# Patient Record
Sex: FEMALE | Race: WHITE | Employment: OTHER | URBAN - METROPOLITAN AREA
[De-identification: names, ages, dates, MRNs, and addresses within clinical notes are randomized per-mention and may not be internally consistent; named-entity substitution may affect disease eponyms.]

---

## 2021-12-30 ENCOUNTER — HOSPITAL ENCOUNTER (EMERGENCY)
Age: 81
Discharge: HOME | End: 2021-12-30
Attending: EMERGENCY MEDICINE
Payer: MEDICARE

## 2021-12-30 ENCOUNTER — APPOINTMENT (OUTPATIENT)
Dept: GENERAL RADIOLOGY | Age: 81
End: 2021-12-30
Attending: EMERGENCY MEDICINE
Payer: MEDICARE

## 2021-12-30 VITALS
HEART RATE: 81 BPM | DIASTOLIC BLOOD PRESSURE: 72 MMHG | TEMPERATURE: 98.6 F | HEIGHT: 66 IN | WEIGHT: 160 LBS | BODY MASS INDEX: 25.71 KG/M2 | SYSTOLIC BLOOD PRESSURE: 159 MMHG | RESPIRATION RATE: 17 BRPM | OXYGEN SATURATION: 95 %

## 2021-12-30 DIAGNOSIS — R06.00 DYSPNEA, UNSPECIFIED TYPE: Primary | ICD-10-CM

## 2021-12-30 DIAGNOSIS — R07.81 PLEURITIC CHEST PAIN: ICD-10-CM

## 2021-12-30 LAB
ALBUMIN SERPL-MCNC: 2.6 G/DL (ref 3.2–4.6)
ALBUMIN/GLOB SERPL: 0.6 {RATIO} (ref 1.2–3.5)
ALP SERPL-CCNC: 70 U/L (ref 50–136)
ALT SERPL-CCNC: 29 U/L (ref 12–65)
ANION GAP SERPL CALC-SCNC: 5 MMOL/L (ref 7–16)
AST SERPL-CCNC: 10 U/L (ref 15–37)
ATRIAL RATE: 79 BPM
BASOPHILS # BLD: 0 K/UL (ref 0–0.2)
BASOPHILS NFR BLD: 0 % (ref 0–2)
BILIRUB SERPL-MCNC: 0.4 MG/DL (ref 0.2–1.1)
BUN SERPL-MCNC: 17 MG/DL (ref 8–23)
CALCIUM SERPL-MCNC: 8.9 MG/DL (ref 8.3–10.4)
CALCULATED P AXIS, ECG09: 71 DEGREES
CALCULATED R AXIS, ECG10: 74 DEGREES
CALCULATED T AXIS, ECG11: 70 DEGREES
CHLORIDE SERPL-SCNC: 106 MMOL/L (ref 98–107)
CO2 SERPL-SCNC: 30 MMOL/L (ref 21–32)
CREAT SERPL-MCNC: 1.2 MG/DL (ref 0.6–1)
DIAGNOSIS, 93000: NORMAL
DIFFERENTIAL METHOD BLD: ABNORMAL
EOSINOPHIL # BLD: 0.3 K/UL (ref 0–0.8)
EOSINOPHIL NFR BLD: 3 % (ref 0.5–7.8)
ERYTHROCYTE [DISTWIDTH] IN BLOOD BY AUTOMATED COUNT: 13.6 % (ref 11.9–14.6)
GLOBULIN SER CALC-MCNC: 4.3 G/DL (ref 2.3–3.5)
GLUCOSE SERPL-MCNC: 173 MG/DL (ref 65–100)
HCT VFR BLD AUTO: 39.1 % (ref 35.8–46.3)
HGB BLD-MCNC: 12.1 G/DL (ref 11.7–15.4)
IMM GRANULOCYTES # BLD AUTO: 0.2 K/UL (ref 0–0.5)
IMM GRANULOCYTES NFR BLD AUTO: 2 % (ref 0–5)
LYMPHOCYTES # BLD: 1.3 K/UL (ref 0.5–4.6)
LYMPHOCYTES NFR BLD: 12 % (ref 13–44)
MAGNESIUM SERPL-MCNC: 3.2 MG/DL (ref 1.8–2.4)
MCH RBC QN AUTO: 29.5 PG (ref 26.1–32.9)
MCHC RBC AUTO-ENTMCNC: 30.9 G/DL (ref 31.4–35)
MCV RBC AUTO: 95.4 FL (ref 79.6–97.8)
MONOCYTES # BLD: 0.6 K/UL (ref 0.1–1.3)
MONOCYTES NFR BLD: 6 % (ref 4–12)
NEUTS SEG # BLD: 8.1 K/UL (ref 1.7–8.2)
NEUTS SEG NFR BLD: 77 % (ref 43–78)
NRBC # BLD: 0 K/UL (ref 0–0.2)
P-R INTERVAL, ECG05: 150 MS
PLATELET # BLD AUTO: 423 K/UL (ref 150–450)
PMV BLD AUTO: 9.4 FL (ref 9.4–12.3)
POTASSIUM SERPL-SCNC: 4.6 MMOL/L (ref 3.5–5.1)
PROT SERPL-MCNC: 6.9 G/DL (ref 6.3–8.2)
Q-T INTERVAL, ECG07: 388 MS
QRS DURATION, ECG06: 84 MS
QTC CALCULATION (BEZET), ECG08: 444 MS
RBC # BLD AUTO: 4.1 M/UL (ref 4.05–5.2)
SODIUM SERPL-SCNC: 141 MMOL/L (ref 136–145)
TROPONIN-HIGH SENSITIVITY: 5.4 PG/ML (ref 0–14)
TROPONIN-HIGH SENSITIVITY: 5.9 PG/ML (ref 0–14)
VENTRICULAR RATE, ECG03: 79 BPM
WBC # BLD AUTO: 10.6 K/UL (ref 4.3–11.1)

## 2021-12-30 PROCEDURE — 80053 COMPREHEN METABOLIC PANEL: CPT

## 2021-12-30 PROCEDURE — 85025 COMPLETE CBC W/AUTO DIFF WBC: CPT

## 2021-12-30 PROCEDURE — 71045 X-RAY EXAM CHEST 1 VIEW: CPT

## 2021-12-30 PROCEDURE — 99285 EMERGENCY DEPT VISIT HI MDM: CPT

## 2021-12-30 PROCEDURE — 93005 ELECTROCARDIOGRAM TRACING: CPT | Performed by: EMERGENCY MEDICINE

## 2021-12-30 PROCEDURE — 83735 ASSAY OF MAGNESIUM: CPT

## 2021-12-30 PROCEDURE — 84484 ASSAY OF TROPONIN QUANT: CPT

## 2021-12-30 RX ORDER — ALBUTEROL SULFATE 90 UG/1
2 AEROSOL, METERED RESPIRATORY (INHALATION)
Qty: 1 EACH | Refills: 0 | Status: SHIPPED | OUTPATIENT
Start: 2021-12-30

## 2021-12-30 RX ORDER — SODIUM CHLORIDE 0.9 % (FLUSH) 0.9 %
5-10 SYRINGE (ML) INJECTION EVERY 8 HOURS
Status: DISCONTINUED | OUTPATIENT
Start: 2021-12-30 | End: 2021-12-30 | Stop reason: HOSPADM

## 2021-12-30 RX ORDER — BUDESONIDE 0.5 MG/2ML
500 INHALANT ORAL 2 TIMES DAILY
Qty: 1 EACH | Refills: 0 | Status: SHIPPED | OUTPATIENT
Start: 2021-12-30

## 2021-12-30 RX ORDER — SODIUM CHLORIDE 0.9 % (FLUSH) 0.9 %
5-10 SYRINGE (ML) INJECTION AS NEEDED
Status: DISCONTINUED | OUTPATIENT
Start: 2021-12-30 | End: 2021-12-30 | Stop reason: HOSPADM

## 2021-12-30 RX ORDER — ALBUTEROL SULFATE 0.83 MG/ML
2.5 SOLUTION RESPIRATORY (INHALATION)
Qty: 1 NEBULE | Refills: 0 | Status: SHIPPED | OUTPATIENT
Start: 2021-12-30

## 2021-12-30 NOTE — ED NOTES
I have reviewed discharge instructions with the patient. The patient verbalized understanding. Patient left ED via Discharge Method: ambulatory to Home with family). Opportunity for questions and clarification provided. Patient given 3 scripts. To continue your aftercare when you leave the hospital, you may receive an automated call from our care team to check in on how you are doing. This is a free service and part of our promise to provide the best care and service to meet your aftercare needs.  If you have questions, or wish to unsubscribe from this service please call [phone]. Thank you for Choosing our [address] [place] [address] Emergency Department.

## 2021-12-30 NOTE — ED PROVIDER NOTES
80-year-old female with a history of COPD presents with persistent shortness of breath. Her   3 weeks ago of pneumonia so her family moved her here from [place] for the holidays. She developed chest pain and shortness of breath and has been seen twice at [facility] last week. She was admitted for pneumonia on the second visit for 2 days. She has completed prednisone and antibiotics. She states she has been using inhaler, but is now out. Shortness of breath is worse with lying flat and minimal exertion. She has some right-sided chest pain with deep breathing. She denies cough or fever. She has had some wheezing. No leg swelling. Last admission -: \"History of Present Illness:   Taken from admission H&P:    Fartun Apple is a 80 y.o. female with HPT, COPD, breast cancer who presents with increasing shortness of breath, cough productive of sputum, and right sided chest pain. She first presented to the ED on  with pleuritic chest pain and shortness of breath. This was felt to be musculoskeletal as she gave a history of trying to pull herself up in a truck with her right arm. She was given lidocaine patches and discharged home. She then had a virtual visit yesterday with urgent care and was instructed to return to the ED given worsening shortness of breath. No fevers or chills. She denies any trauma, falls, or cardiac history. Of note, she had a recent car ride to [place] from [place]. No history of DVT/PE. She is vaccinated against COVID but did not receive her flu vaccine. Hospital Course:     1.  Acute respiratory failure with hypoxia - POA; hypoxic on arrival requiring 3L via NC; no home oxygen requirement; etiology of hypoxic respiratory failure with multifactorial in the setting of R sided pneumonia and mild COPD exacerbation; able to quickly wean to RA on the day prior to discharge; underwent 6MWT on the day of discharge and O2 stayed above 95% while walking; no home oxygen needed at discharge  2. CAP, RLL - noted; CXR with R basilar consolidation and small pleural effuusion; likely represents CAP with small parapneumonia effusion; effusion was evaluated with POCUS for consideration of thoracentesis, though no drainable pocket was appreciated; RCx was ordered, but pt's cough remained non-productive so it was never collected; she was tx with abx while here and will be discharged with three more days of oral abx to complete a 5 day course  3. COPD exacerbation, acute - POA; resolving on the day of discharge, pt without wheezing today; pt will continue prednisone for three more days at discharge to complete a 5 day course; she will continue her home bronchodilators   4. R pleural effusion - likely parapneumonic; POCUS without good pocket to drain; worked with IS given concern for atelectasis also contributing to hypoxia  5. Chest wall pain - noted; improved with prn lidocaine patches  6. Steroid induced hyperglycemia - noted; A1c 5.4% this ad mission  7. Normocytic anemia - stable and around her baseline at the time of discharge  8. Compression fx - noted on CT scan on arrival; age indeterminate; denies any recent trauma so suspect this is chronic in nature; PCP follow up when she returns home \"    CTA:  Impression  Performed by George Scott  1. Pulmonary arteries:  No evidence of acute pulmonary embolism. 2. Small to moderate right pleural effusion with overlying atelectasis. Several ill-defined micronodular opacities in the right lung may be infectious/inflammatory. 3. Unhealed compression fracture of the T12 vertebral body with approximately 40% height loss, age-indeterminate by CT. Correlate with history of trauma and pain referrable to this injury. MRI could further evaluate acuity, as clinically indicated. Shortness of Breath  Associated symptoms include chest pain.  Pertinent negatives include no fever, no headaches, no cough, no vomiting, no abdominal pain, no rash and no leg swelling. No past medical history on file. No past surgical history on file. No family history on file. Social History     Socioeconomic History    Marital status:      Spouse name: Not on file    Number of children: Not on file    Years of education: Not on file    Highest education level: Not on file   Occupational History    Not on file   Tobacco Use    Smoking status: Not on file    Smokeless tobacco: Not on file   Substance and Sexual Activity    Alcohol use: Not on file    Drug use: Not on file    Sexual activity: Not on file   Other Topics Concern    Not on file   Social History Narrative    Not on file     Social Determinants of Health     Financial Resource Strain:     Difficulty of Paying Living Expenses: Not on file   Food Insecurity:     Worried About Running Out of Food in the Last Year: Not on file    Cherelle of Food in the Last Year: Not on file   Transportation Needs:     Lack of Transportation (Medical): Not on file    Lack of Transportation (Non-Medical):  Not on file   Physical Activity:     Days of Exercise per Week: Not on file    Minutes of Exercise per Session: Not on file   Stress:     Feeling of Stress : Not on file   Social Connections:     Frequency of Communication with Friends and Family: Not on file    Frequency of Social Gatherings with Friends and Family: Not on file    Attends [employer] Services: Not on file    Active Member of [address] [place] [address] or Organizations: Not on file    Attends Club or Organization Meetings: Not on file    Marital Status: Not on file   Intimate Partner Violence:     Fear of Current or Ex-Partner: Not on file    Emotionally Abused: Not on file    Physically Abused: Not on file    Sexually Abused: Not on file   Housing Stability:     Unable to Pay for Housing in the Last Year: Not on file    Number of [place] in the Last Year: Not on file    Unstable Housing in the Last Year: Not on file ALLERGIES: Morphine    Review of Systems   Constitutional: Positive for fatigue. Negative for fever. HENT: Negative for hearing loss. Eyes: Negative for visual disturbance. Respiratory: Positive for shortness of breath. Negative for cough. Cardiovascular: Positive for chest pain. Negative for leg swelling. Gastrointestinal: Negative for abdominal pain, diarrhea, nausea and vomiting. Musculoskeletal: Negative for back pain. Skin: Negative for rash. Neurological: Negative for headaches. Psychiatric/Behavioral: Negative for confusion. All other systems reviewed and are negative. Vitals:    12/30/21 0942 12/30/21 0943   BP: (!) 133/53    Pulse: 81    Resp: 17    Temp: 98.6 °F (37 °C)    SpO2: 97%    Weight:  72.6 kg (160 lb)   Height:  5' 6\" (1.676 m)            Physical Exam  Vitals and nursing note reviewed. Constitutional:       Appearance: Normal appearance. She is well-developed. HENT:      Head: Normocephalic and atraumatic. Nose: Nose normal.      Mouth/Throat:      Mouth: Mucous membranes are moist.   Eyes:      Pupils: Pupils are equal, round, and reactive to light. Cardiovascular:      Rate and Rhythm: Regular rhythm. Heart sounds: Normal heart sounds. Pulmonary:      Effort: Pulmonary effort is normal.      Breath sounds: Normal breath sounds. Abdominal:      Palpations: Abdomen is soft. Tenderness: There is no abdominal tenderness. Musculoskeletal:         General: No deformity. Normal range of motion. Cervical back: Normal range of motion and neck supple. Skin:     General: Skin is warm and dry. Neurological:      General: No focal deficit present. Mental Status: She is alert. Mental status is at baseline.    Psychiatric:         Mood and Affect: Mood normal.         Behavior: Behavior normal.          MDM  Number of Diagnoses or Management Options  Diagnosis management comments: Parts of this document were created using dragon voice recognition software. The chart has been reviewed but errors may still be present. I wore appropriate PPE throughout this patient's ED visit. Norm Crespo MD, 11:33 AM    11:50 AM  No hypoxia. Slight persistent right lower lobe pneumonia. Already worked up for PE and this was negative. Will discharge with prescription for inhaler. I discussed the results of all labs, procedures, radiographs, and treatments with the patient and available family. Treatment plan is agreed upon and the patient is ready for discharge. Questions about treatment in the ED and differential diagnosis of presenting condition were answered. Patient was given verbal discharge instructions including, but not limited to, importance of returning to the emergency department for any concern of worsening or continued symptoms. Instructions were given to follow up with a primary care provider or specialist within 1-2 days. Adverse effects of medications, if prescribed, were discussed and patient was advised to refrain from significant physical activity until followed up by primary care physician and to not drive or operate heavy machinery after taking any sedating substances.              Amount and/or Complexity of Data Reviewed  Clinical lab tests: reviewed and ordered (Results for orders placed or performed during the hospital encounter of 12/30/21  -CBC WITH AUTOMATED DIFF:        Result                      Value             Ref Range           WBC                         10.6              4.3 - 11.1 K*       RBC                         4.10              4.05 - 5.2 M*       HGB                         12.1              11.7 - 15.4 *       HCT                         39.1              35.8 - 46.3 %       MCV                         95.4              79.6 - 97.8 *       MCH                         29.5              26.1 - 32.9 *       MCHC                        30.9 (L)          31.4 - 35.0 *       RDW                         13.6 11.9 - 14.6 %       PLATELET                    423               150 - 450 K/*       MPV                         9.4               9.4 - 12.3 FL       ABSOLUTE NRBC               0.00              0.0 - 0.2 K/*       DF                          AUTOMATED                             NEUTROPHILS                 77                43 - 78 %           LYMPHOCYTES                 12 (L)            13 - 44 %           MONOCYTES                   6                 4.0 - 12.0 %        EOSINOPHILS                 3                 0.5 - 7.8 %         BASOPHILS                   0                 0.0 - 2.0 %         IMMATURE GRANULOCYTES       2                 0.0 - 5.0 %         ABS. NEUTROPHILS            8.1               1.7 - 8.2 K/*       ABS. LYMPHOCYTES            1.3               0.5 - 4.6 K/*       ABS. MONOCYTES              0.6               0.1 - 1.3 K/*       ABS. EOSINOPHILS            0.3               0.0 - 0.8 K/*       ABS. BASOPHILS              0.0               0.0 - 0.2 K/*       ABS. IMM.  GRANS.            0.2               0.0 - 0.5 K/*  -METABOLIC PANEL, COMPREHENSIVE:        Result                      Value             Ref Range           Sodium                      141               136 - 145 mm*       Potassium                   4.6               3.5 - 5.1 mm*       Chloride                    106               98 - 107 mmo*       CO2                         30                21 - 32 mmol*       Anion gap                   5 (L)             7 - 16 mmol/L       Glucose                     173 (H)           65 - 100 mg/*       BUN                         17                8 - 23 MG/DL        Creatinine                  1.20 (H)          0.6 - 1.0 MG*       GFR est AA                  55 (L)            >60 ml/min/1*       GFR est non-AA              46 (L)            >60 ml/min/1*       Calcium                     8.9               8.3 - 10.4 M*       Bilirubin, total            0.4 0.2 - 1.1 MG*       ALT (SGPT)                  29                12 - 65 U/L         AST (SGOT)                  10 (L)            15 - 37 U/L         Alk. phosphatase            70                50 - 136 U/L        Protein, total              6.9               6.3 - 8.2 g/*       Albumin                     2.6 (L)           3.2 - 4.6 g/*       Globulin                    4.3 (H)           2.3 - 3.5 g/*       A-G Ratio                   0.6 (L)           1.2 - 3.5      -MAGNESIUM:        Result                      Value             Ref Range           Magnesium                   3.2 (H)           1.8 - 2.4 mg*  -TROPONIN-HIGH SENSITIVITY:        Result                      Value             Ref Range           Troponin-High Sensitiv*     5.9               0 - 14 pg/mL   -EKG, 12 LEAD, INITIAL:        Result                      Value             Ref Range           Ventricular Rate            79                BPM                 Atrial Rate                 79                BPM                 P-R Interval                150               ms                  QRS Duration                84                ms                  Q-T Interval                388               ms                  QTC Calculation (Bezet)     444               ms                  Calculated P Axis           71                degrees             Calculated R Axis           74                degrees             Calculated T Axis           70                degrees             Diagnosis                                                     Normal sinus rhythm   Normal ECG   No previous ECGs available     )  Tests in the radiology section of CPT®: ordered and reviewed (XR CHEST PORT    Result Date: 12/30/2021  EXAM: CHEST X-RAY, 1 VIEW INDICATION: Shortness of Breath. COMPARISON: None available TECHNIQUE: Single AP view of the chest was obtained. FINDINGS: Mild bibasilar lung infiltrates. A hiatal hernia is present.  No pneumothorax or pleural effusion. The cardiomediastinal silhouette is within normal limits for size. Aortic atherosclerotic calcifications. The osseous structures are unremarkable. 1.  Mild bibasilar lung infiltrates.  2.  Hiatal hernia.    )           EKG    Date/Time: 12/30/2021 11:33 AM  Performed by: Jim Chawla MD  Authorized by: Jim Chawla MD     ECG reviewed by ED Physician in the absence of a cardiologist: yes    Interpretation:     Interpretation: normal    Rate:     ECG rate:  79    ECG rate assessment: normal    Rhythm:     Rhythm: sinus rhythm    Ectopy:     Ectopy: none    Conduction:     Conduction: normal    ST segments:     ST segments:  Normal  T waves:     T waves: normal

## 2021-12-30 NOTE — ED TRIAGE NOTES
Patient arrives ambulatory to triage with mask in place. Went to [facility] Monday for shortness of breath and chest pain. Being treated for pneumonia. Reports being discharged from hospital on Friday. Reports no improvement in chest pain or shortness of breath. Denies cough. Reports shortness of breath with exertion. Denies leg swelling.

## 2023-05-15 RX ORDER — ALBUTEROL SULFATE 90 UG/1
2 AEROSOL, METERED RESPIRATORY (INHALATION) EVERY 4 HOURS PRN
COMMUNITY
Start: 2021-12-30

## 2023-05-15 RX ORDER — ALBUTEROL SULFATE 2.5 MG/3ML
2.5 SOLUTION RESPIRATORY (INHALATION) EVERY 4 HOURS PRN
COMMUNITY
Start: 2021-12-30

## 2023-05-15 RX ORDER — BUDESONIDE 0.5 MG/2ML
500 INHALANT ORAL 2 TIMES DAILY
COMMUNITY
Start: 2021-12-30

## 2025-05-27 ENCOUNTER — APPOINTMENT (OUTPATIENT)
Dept: GENERAL RADIOLOGY | Age: 85
End: 2025-05-27
Payer: MEDICARE

## 2025-05-27 ENCOUNTER — HOSPITAL ENCOUNTER (INPATIENT)
Age: 85
LOS: 3 days | Discharge: HOME | End: 2025-05-30
Attending: INTERNAL MEDICINE | Admitting: INTERNAL MEDICINE
Payer: MEDICARE

## 2025-05-27 DIAGNOSIS — L03.113 CELLULITIS OF RIGHT UPPER EXTREMITY: ICD-10-CM

## 2025-05-27 DIAGNOSIS — A41.9 SEVERE SEPSIS (HCC): Primary | ICD-10-CM

## 2025-05-27 DIAGNOSIS — R65.20 SEVERE SEPSIS (HCC): Primary | ICD-10-CM
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
## 2025-05-27 PROBLEM — E89.0 POSTOPERATIVE HYPOTHYROIDISM: Status: ACTIVE | Noted: 2021-12-22

## 2025-05-27 PROBLEM — Z66 DNR (DO NOT RESUSCITATE): Status: ACTIVE | Noted: 2025-05-27

## 2025-05-27 PROBLEM — Z86.718 HISTORY OF DVT (DEEP VEIN THROMBOSIS): Status: ACTIVE | Noted: 2025-05-27

## 2025-05-27 PROBLEM — J44.9 COPD (CHRONIC OBSTRUCTIVE PULMONARY DISEASE) (HCC): Status: ACTIVE | Noted: 2025-05-27

## 2025-05-27 LAB
ANION GAP SERPL CALC-SCNC: 12 MMOL/L (ref 7–16)
BASOPHILS # BLD: 0.03 K/UL (ref 0–0.2)
BASOPHILS NFR BLD: 0.2 % (ref 0–2)
BUN SERPL-MCNC: 19 MG/DL (ref 8–23)
CALCIUM SERPL-MCNC: 8.9 MG/DL (ref 8.8–10.2)
CHLORIDE SERPL-SCNC: 103 MMOL/L (ref 98–107)
CO2 SERPL-SCNC: 22 MMOL/L (ref 20–29)
CREAT SERPL-MCNC: 1.06 MG/DL (ref 0.6–1.1)
DIFFERENTIAL METHOD BLD: ABNORMAL
EOSINOPHIL # BLD: 0.06 K/UL (ref 0–0.8)
EOSINOPHIL NFR BLD: 0.5 % (ref 0.5–7.8)
ERYTHROCYTE [DISTWIDTH] IN BLOOD BY AUTOMATED COUNT: 13.8 % (ref 11.9–14.6)
GLUCOSE SERPL-MCNC: 138 MG/DL (ref 70–99)
HCT VFR BLD AUTO: 36.1 % (ref 35.8–46.3)
HGB BLD-MCNC: 11.6 G/DL (ref 11.7–15.4)
IMM GRANULOCYTES # BLD AUTO: 0.06 K/UL (ref 0–0.5)
IMM GRANULOCYTES NFR BLD AUTO: 0.5 % (ref 0–5)
LACTATE SERPL-SCNC: 2.3 MMOL/L (ref 0.5–2)
LACTATE SERPL-SCNC: 3.1 MMOL/L (ref 0.5–2)
LYMPHOCYTES # BLD: 0.96 K/UL (ref 0.5–4.6)
LYMPHOCYTES NFR BLD: 7.2 % (ref 13–44)
MCH RBC QN AUTO: 29.4 PG (ref 26.1–32.9)
MCHC RBC AUTO-ENTMCNC: 32.1 G/DL (ref 31.4–35)
MCV RBC AUTO: 91.4 FL (ref 82–102)
MONOCYTES # BLD: 0.85 K/UL (ref 0.1–1.3)
MONOCYTES NFR BLD: 6.4 % (ref 4–12)
NEUTS SEG # BLD: 11.35 K/UL (ref 1.7–8.2)
NEUTS SEG NFR BLD: 85.2 % (ref 43–78)
NRBC # BLD: 0 K/UL (ref 0–0.2)
PLATELET # BLD AUTO: 317 K/UL (ref 150–450)
PMV BLD AUTO: 10.1 FL (ref 9.4–12.3)
POTASSIUM SERPL-SCNC: 4.1 MMOL/L (ref 3.5–5.1)
PROCALCITONIN SERPL-MCNC: 0.08 NG/ML (ref 0–0.1)
RBC # BLD AUTO: 3.95 M/UL (ref 4.05–5.2)
SODIUM SERPL-SCNC: 137 MMOL/L (ref 136–145)
WBC # BLD AUTO: 13.3 K/UL (ref 4.3–11.1)

## 2025-05-27 PROCEDURE — 2580000003 HC RX 258: Performed by: INTERNAL MEDICINE

## 2025-05-27 PROCEDURE — 80048 BASIC METABOLIC PNL TOTAL CA: CPT

## 2025-05-27 PROCEDURE — 90714 TD VACC NO PRESV 7 YRS+ IM: CPT | Performed by: NURSE PRACTITIONER

## 2025-05-27 PROCEDURE — 83605 ASSAY OF LACTIC ACID: CPT

## 2025-05-27 PROCEDURE — 6360000002 HC RX W HCPCS: Performed by: INTERNAL MEDICINE

## 2025-05-27 PROCEDURE — 6370000000 HC RX 637 (ALT 250 FOR IP): Performed by: INTERNAL MEDICINE

## 2025-05-27 PROCEDURE — 6360000002 HC RX W HCPCS: Performed by: NURSE PRACTITIONER

## 2025-05-27 PROCEDURE — 84145 PROCALCITONIN (PCT): CPT

## 2025-05-27 PROCEDURE — 94640 AIRWAY INHALATION TREATMENT: CPT

## 2025-05-27 PROCEDURE — 2580000003 HC RX 258: Performed by: NURSE PRACTITIONER

## 2025-05-27 PROCEDURE — 85025 COMPLETE CBC W/AUTO DIFF WBC: CPT

## 2025-05-27 PROCEDURE — 87040 BLOOD CULTURE FOR BACTERIA: CPT

## 2025-05-27 PROCEDURE — 1100000000 HC RM PRIVATE

## 2025-05-27 PROCEDURE — 73130 X-RAY EXAM OF HAND: CPT

## 2025-05-27 PROCEDURE — 94760 N-INVAS EAR/PLS OXIMETRY 1: CPT

## 2025-05-27 PROCEDURE — 90471 IMMUNIZATION ADMIN: CPT | Performed by: NURSE PRACTITIONER

## 2025-05-27 PROCEDURE — 99285 EMERGENCY DEPT VISIT HI MDM: CPT

## 2025-05-27 PROCEDURE — 2500000003 HC RX 250 WO HCPCS: Performed by: INTERNAL MEDICINE

## 2025-05-27 PROCEDURE — 36415 COLL VENOUS BLD VENIPUNCTURE: CPT

## 2025-05-27 RX ORDER — ENOXAPARIN SODIUM 100 MG/ML
40 INJECTION SUBCUTANEOUS DAILY
Status: DISCONTINUED | OUTPATIENT
Start: 2025-05-27 | End: 2025-05-27

## 2025-05-27 RX ORDER — POTASSIUM CHLORIDE 1500 MG/1
40 TABLET, EXTENDED RELEASE ORAL PRN
Status: DISCONTINUED | OUTPATIENT
Start: 2025-05-27 | End: 2025-05-30 | Stop reason: HOSPADM

## 2025-05-27 RX ORDER — BUSPIRONE HYDROCHLORIDE 7.5 MG/1
7.5 TABLET ORAL 2 TIMES DAILY
COMMUNITY
Start: 2025-04-22 | End: 2025-05-27

## 2025-05-27 RX ORDER — ANASTROZOLE 1 MG/1
1 TABLET ORAL DAILY
COMMUNITY
End: 2025-05-27 | Stop reason: ALTCHOICE

## 2025-05-27 RX ORDER — ANASTROZOLE 1 MG/1
1 TABLET ORAL DAILY
Status: CANCELLED | OUTPATIENT
Start: 2025-05-27

## 2025-05-27 RX ORDER — POTASSIUM CHLORIDE 7.45 MG/ML
10 INJECTION INTRAVENOUS PRN
Status: DISCONTINUED | OUTPATIENT
Start: 2025-05-27 | End: 2025-05-30 | Stop reason: HOSPADM

## 2025-05-27 RX ORDER — 0.9 % SODIUM CHLORIDE 0.9 %
1000 INTRAVENOUS SOLUTION INTRAVENOUS
Status: COMPLETED | OUTPATIENT
Start: 2025-05-27 | End: 2025-05-27

## 2025-05-27 RX ORDER — SODIUM CHLORIDE 0.9 % (FLUSH) 0.9 %
5-40 SYRINGE (ML) INJECTION PRN
Status: DISCONTINUED | OUTPATIENT
Start: 2025-05-27 | End: 2025-05-30 | Stop reason: HOSPADM

## 2025-05-27 RX ORDER — ALBUTEROL SULFATE 0.83 MG/ML
2.5 SOLUTION RESPIRATORY (INHALATION) EVERY 4 HOURS PRN
Status: DISCONTINUED | OUTPATIENT
Start: 2025-05-27 | End: 2025-05-30 | Stop reason: HOSPADM

## 2025-05-27 RX ORDER — ASPIRIN 81 MG/1
81 TABLET ORAL DAILY
Status: ON HOLD | COMMUNITY
End: 2025-05-30 | Stop reason: HOSPADM

## 2025-05-27 RX ORDER — FUROSEMIDE 20 MG/1
20 TABLET ORAL PRN
COMMUNITY

## 2025-05-27 RX ORDER — FOLIC ACID 1 MG/1
1000 TABLET ORAL DAILY
COMMUNITY

## 2025-05-27 RX ORDER — CHOLECALCIFEROL (VITAMIN D3) 1250 MCG
CAPSULE ORAL
COMMUNITY

## 2025-05-27 RX ORDER — SODIUM CHLORIDE 0.9 % (FLUSH) 0.9 %
5-40 SYRINGE (ML) INJECTION EVERY 12 HOURS SCHEDULED
Status: DISCONTINUED | OUTPATIENT
Start: 2025-05-27 | End: 2025-05-30 | Stop reason: HOSPADM

## 2025-05-27 RX ORDER — ACETAMINOPHEN 325 MG/1
650 TABLET ORAL EVERY 6 HOURS PRN
Status: DISCONTINUED | OUTPATIENT
Start: 2025-05-27 | End: 2025-05-30 | Stop reason: HOSPADM

## 2025-05-27 RX ORDER — LEVOTHYROXINE SODIUM 112 UG/1
112 TABLET ORAL DAILY
Status: DISCONTINUED | OUTPATIENT
Start: 2025-05-27 | End: 2025-05-30 | Stop reason: HOSPADM

## 2025-05-27 RX ORDER — SODIUM CHLORIDE 9 MG/ML
INJECTION, SOLUTION INTRAVENOUS CONTINUOUS
Status: ACTIVE | OUTPATIENT
Start: 2025-05-27 | End: 2025-05-28

## 2025-05-27 RX ORDER — ASPIRIN 81 MG/1
81 TABLET ORAL DAILY
Status: CANCELLED | OUTPATIENT
Start: 2025-05-27

## 2025-05-27 RX ORDER — ESCITALOPRAM OXALATE 10 MG/1
10 TABLET ORAL DAILY
COMMUNITY
End: 2025-05-27

## 2025-05-27 RX ORDER — ACETAMINOPHEN 650 MG/1
650 SUPPOSITORY RECTAL EVERY 6 HOURS PRN
Status: DISCONTINUED | OUTPATIENT
Start: 2025-05-27 | End: 2025-05-30 | Stop reason: HOSPADM

## 2025-05-27 RX ORDER — OXYCODONE HYDROCHLORIDE 5 MG/1
2.5 TABLET ORAL EVERY 4 HOURS PRN
Refills: 0 | Status: DISCONTINUED | OUTPATIENT
Start: 2025-05-27 | End: 2025-05-30 | Stop reason: HOSPADM

## 2025-05-27 RX ORDER — ONDANSETRON 4 MG/1
4 TABLET, ORALLY DISINTEGRATING ORAL EVERY 8 HOURS PRN
Status: DISCONTINUED | OUTPATIENT
Start: 2025-05-27 | End: 2025-05-30 | Stop reason: HOSPADM

## 2025-05-27 RX ORDER — FOLIC ACID 1 MG/1
1000 TABLET ORAL DAILY
Status: DISCONTINUED | OUTPATIENT
Start: 2025-05-27 | End: 2025-05-30 | Stop reason: HOSPADM

## 2025-05-27 RX ORDER — POLYETHYLENE GLYCOL 3350 17 G/17G
17 POWDER, FOR SOLUTION ORAL DAILY PRN
Status: DISCONTINUED | OUTPATIENT
Start: 2025-05-27 | End: 2025-05-30 | Stop reason: HOSPADM

## 2025-05-27 RX ORDER — SODIUM CHLORIDE 9 MG/ML
INJECTION, SOLUTION INTRAVENOUS PRN
Status: DISCONTINUED | OUTPATIENT
Start: 2025-05-27 | End: 2025-05-30 | Stop reason: HOSPADM

## 2025-05-27 RX ORDER — ONDANSETRON 2 MG/ML
4 INJECTION INTRAMUSCULAR; INTRAVENOUS EVERY 6 HOURS PRN
Status: DISCONTINUED | OUTPATIENT
Start: 2025-05-27 | End: 2025-05-30 | Stop reason: HOSPADM

## 2025-05-27 RX ORDER — BUSPIRONE HYDROCHLORIDE 5 MG/1
7.5 TABLET ORAL 2 TIMES DAILY
Status: CANCELLED | OUTPATIENT
Start: 2025-05-27

## 2025-05-27 RX ORDER — BUDESONIDE 0.5 MG/2ML
500 INHALANT ORAL 2 TIMES DAILY
Status: DISCONTINUED | OUTPATIENT
Start: 2025-05-27 | End: 2025-05-30 | Stop reason: HOSPADM

## 2025-05-27 RX ORDER — MAGNESIUM SULFATE IN WATER 40 MG/ML
2000 INJECTION, SOLUTION INTRAVENOUS PRN
Status: DISCONTINUED | OUTPATIENT
Start: 2025-05-27 | End: 2025-05-30 | Stop reason: HOSPADM

## 2025-05-27 RX ORDER — OXYCODONE HYDROCHLORIDE 5 MG/1
5 TABLET ORAL EVERY 4 HOURS PRN
Refills: 0 | Status: DISCONTINUED | OUTPATIENT
Start: 2025-05-27 | End: 2025-05-30 | Stop reason: HOSPADM

## 2025-05-27 RX ORDER — HYDROXYZINE HYDROCHLORIDE 10 MG/5ML
4 SYRUP ORAL 2 TIMES DAILY
COMMUNITY

## 2025-05-27 RX ORDER — POTASSIUM CHLORIDE 1.5 G/1.58G
10 POWDER, FOR SOLUTION ORAL DAILY
COMMUNITY

## 2025-05-27 RX ORDER — ESCITALOPRAM OXALATE 10 MG/1
10 TABLET ORAL DAILY
Status: CANCELLED | OUTPATIENT
Start: 2025-05-27

## 2025-05-27 RX ORDER — LEVOTHYROXINE SODIUM 112 UG/1
112 TABLET ORAL DAILY
COMMUNITY

## 2025-05-27 RX ADMIN — CLOSTRIDIUM TETANI TOXOID ANTIGEN (FORMALDEHYDE INACTIVATED) AND CORYNEBACTERIUM DIPHTHERIAE TOXOID ANTIGEN (FORMALDEHYDE INACTIVATED) 0.5 ML: 5; 2 INJECTION, SUSPENSION INTRAMUSCULAR at 16:20

## 2025-05-27 RX ADMIN — BUDESONIDE INHALATION 500 MCG: 0.5 SUSPENSION RESPIRATORY (INHALATION) at 20:48

## 2025-05-27 RX ADMIN — SODIUM CHLORIDE: 0.9 INJECTION, SOLUTION INTRAVENOUS at 16:24

## 2025-05-27 RX ADMIN — SODIUM CHLORIDE, PRESERVATIVE FREE 10 ML: 5 INJECTION INTRAVENOUS at 19:38

## 2025-05-27 RX ADMIN — SODIUM CHLORIDE 3000 MG: 9 INJECTION, SOLUTION INTRAVENOUS at 21:07

## 2025-05-27 RX ADMIN — SODIUM CHLORIDE 3000 MG: 9 INJECTION, SOLUTION INTRAVENOUS at 16:26

## 2025-05-27 RX ADMIN — APIXABAN 2.5 MG: 5 TABLET, FILM COATED ORAL at 20:18

## 2025-05-27 RX ADMIN — SODIUM CHLORIDE 1000 ML: 0.9 INJECTION, SOLUTION INTRAVENOUS at 15:16

## 2025-05-27 ASSESSMENT — LIFESTYLE VARIABLES
HOW OFTEN DO YOU HAVE A DRINK CONTAINING ALCOHOL: NEVER
HOW MANY STANDARD DRINKS CONTAINING ALCOHOL DO YOU HAVE ON A TYPICAL DAY: PATIENT DOES NOT DRINK

## 2025-05-27 ASSESSMENT — PAIN - FUNCTIONAL ASSESSMENT
PAIN_FUNCTIONAL_ASSESSMENT: 0-10
PAIN_FUNCTIONAL_ASSESSMENT: 0-10

## 2025-05-27 ASSESSMENT — ENCOUNTER SYMPTOMS
SHORTNESS OF BREATH: 0
ABDOMINAL PAIN: 0

## 2025-05-27 ASSESSMENT — PAIN SCALES - GENERAL
PAINLEVEL_OUTOF10: 1

## 2025-05-27 ASSESSMENT — PAIN DESCRIPTION - LOCATION: LOCATION: HAND

## 2025-05-27 ASSESSMENT — PAIN DESCRIPTION - ORIENTATION: ORIENTATION: RIGHT

## 2025-05-27 NOTE — ED TRIAGE NOTES
Pt ambulatory unassisted to triage. Pt complains of a cat bite to her R hand yesterday. Reports since then the site has become red, swollen and painful.

## 2025-05-27 NOTE — PROGRESS NOTES
4 Eyes Skin Assessment     NAME:  Melonie Spangler  YOB: 1940  MEDICAL RECORD NUMBER:  [id number]    The patient is being assessed for  Admission    I agree that at least one RN has performed a thorough Head to Toe Skin Assessment on the patient. ALL assessment sites listed below have been assessed.      Areas assessed by both nurses:    Head, Face, Ears, Shoulders, Back, Chest, Arms, Elbows, Hands, Sacrum. Buttock, Coccyx, Ischium, Legs. Feet and Heels, and Under Medical Devices         Does the Patient have a Wound? Yes wound(s) were present on assessment. LDA wound assessment was Initiated and completed by RN       Yonas Prevention initiated by RN: No  Wound Care Orders initiated by RN: Yes    Pressure Injury (Stage 3,4, Unstageable, DTI, NWPT, and Complex wounds) if present, place Wound referral order by RN under : No    New Ostomies, if present place, Ostomy referral order under : No     Nurse 1 eSignature: Electronically signed by Kaci Rice RN on 5/27/25 at 6:22 PM EDT    **SHARE this note so that the co-signing nurse can place an eSignature**    Nurse 2 eSignature: Electronically signed by Heidy Sullivan RN on 5/27/25 at 6:25 PM EDT

## 2025-05-27 NOTE — ED PROVIDER NOTES
Emergency Department Provider Note       [facility] EMERGENCY DEPT   PCP: No primary care provider on file.   Age: 85 y.o.   Sex: female     DISPOSITION Admitted 05/27/2025 04:02:31 PM    ICD-10-CM    1. Severe sepsis (HCC)  A41.9     R65.20       2. Cat bite, initial encounter  W55.01XA       3. Cellulitis of right upper extremity  L03.113           Medical Decision Making     85-year-old female with a history of asthma who presents to the emergency department today with complaint of a cat bite to the right wrist that occurred yesterday.  She has erythema and tenderness to the right wrist, consistent with cellulitis.  No drainage or areas of fluctuance.  She is a bit febrile and tachycardic upon arrival.  Will proceed with workup to include lactic and procalcitonin.    Lactic acid 3.1.  WBC 13.3.  Case discussed with my attending.  Patient to be admitted for further management of sepsis and cellulitis.  I discussed this with the patient and her family.  They agree with plan.  Hospitalist consulted for admission and have agreed to admit.     1 or more acute illnesses that pose a threat to life or bodily function.   Shared medical decision making was utilized in creating the patients health plan today.  I independently ordered and reviewed each unique test.               The patient was admitted and I have discussed patient management with the admitting provider.  SEP-1 CORE MEASURE    Fluid Resuscitation Rational: less than 30ml/kg because patient does not meet criteria for septic shock.     Repeat Lactate Level: improving    Reassessment Exam: Not applicable. Patient does not have septic shock.    Critical Care Procedure Note: 37 minutes of critical care time was performed in the emergency department.  This time was separate from any other procedures listed during the patients emergency department course. The failure to initiate these interventions on an urgent basis would likely have resulted in sudden, clinically

## 2025-05-27 NOTE — PROGRESS NOTES
TRANSFER - IN REPORT:    Verbal report received from Sylvie Euceda on Melonie Spangler  being received from ED for routine progression of patient care      Report consisted of patient's Situation, Background, Assessment and   Recommendations(SBAR).     Information from the following report(s) Nurse Handoff Report was reviewed with the receiving nurse.    Opportunity for questions and clarification was provided.      Assessment to be completed upon patient's arrival to unit and then care will be assumed.

## 2025-05-27 NOTE — ED NOTES
TRANSFER - OUT REPORT:    Verbal report given to Kaci on Melonie Spangler  being transferred to [facility] for routine progression of patient care       Report consisted of patient's Situation, Background, Assessment and   Recommendations(SBAR).     Information from the following report(s) Nurse Handoff Report and ED SBAR was reviewed with the receiving nurse.    Lines:   Peripheral IV 05/27/25 Left;Dorsal Hand (Active)        Opportunity for questions and clarification was provided.      Patient transported with:  Sylvie Alvares, RN  05/27/25 4507

## 2025-05-27 NOTE — H&P
Hospitalist History and Physical   Admit Date:  2025  1:45 PM   Name:  Melonie Spangler   Age:  85 y.o.  Sex:  female  :  1940   MRN:  [id number]   Room:  [place] [address]    Presenting/Chief Complaint: Animal Bite     Reason(s) for Admission: Severe sepsis (HCC) [A41.9, R65.20]     History of Present Illness:   Melonie Spangler is a 85 y.o. female with medical history of breast cancer, COPD, hypothyroidism, DVT on Eliquis.  Patient presented to emergency department secondary to cat bite of the right wrist that occurred yesterday and states that it was her granddaughter's cat who lives indoors and is a pet.  Patient presenting secondary to chills and bodyaches in the last 24 hours.  On my examination patient with swelling of right hand and erythematous right thumb.  Patient is able to move hand but does state that she has increased pain with movements.  Patient states that pain has improved since receiving pain medication.    While in the emergency department patient received IV fluids, tetanus shot and IV antibiotics with Unasyn.  Blood cultures to be drawn and will place on IV antibiotics      Assessment & Plan:     Severe sepsis  Cat bite  Lactic acidosis  Patient with cat bite to the wrist yesterday  White count slightly elevated at 13.3  Lactic acid 3.1 on admission  Procalcitonin normal  Will check x-ray of right hand  Patient received IV fluids and antibiotic in the emergency department  Blood culture x 2 drawn  Continue IV Unasyn   Orthopedics consulted given swelling erythema and slight decreased range of motion of right hand  Continue to monitor    COPD  Lungs clear to auscultation  Patient states she wears oxygen at night and will continue during hospitalization  continue home inhalers    DVT on Eliquis  Eliquis 2.5 mg twice daily    Hypothyroidism  Continue levothyroxine    DNR    PT/OT evals ordered?  Not ordered; patient not expected to need rehab  Diet: No diet orders on file  VTE prophylaxis:  1.30 K/UL    Eosinophils Absolute 0.06 0.00 - 0.80 K/UL    Basophils Absolute 0.03 0.00 - 0.20 K/UL    Immature Granulocytes Absolute 0.06 0.0 - 0.5 K/UL   BMP    Collection Time: 05/27/25  2:14 PM   Result Value Ref Range    Sodium 137 136 - 145 mmol/L    Potassium 4.1 3.5 - 5.1 mmol/L    Chloride 103 98 - 107 mmol/L    CO2 22 20 - 29 mmol/L    Anion Gap 12 7 - 16 mmol/L    Glucose 138 (H) 70 - 99 mg/dL    BUN 19 8 - 23 MG/DL    Creatinine 1.06 0.60 - 1.10 MG/DL    Est, Glom Filt Rate 51 (L) >60 ml/min/1.73m2    Calcium 8.9 8.8 - 10.2 MG/DL   Lactate, Sepsis    Collection Time: 05/27/25  2:14 PM   Result Value Ref Range    Lactic Acid, Sepsis 3.1 (H) 0.5 - 2.0 MMOL/L   Procalcitonin    Collection Time: 05/27/25  2:14 PM   Result Value Ref Range    Procalcitonin 0.08 0.00 - 0.10 ng/mL       No results for input(s): \"COVID19\" in the last 72 hours.    No results found.      Signed:  Janak Monahan MD    Part of this note may have been written by using a voice dictation software.  The note has been proof read but may still contain some grammatical/other typographical errors.
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
## 2025-05-28 DIAGNOSIS — R65.20 SEVERE SEPSIS (HCC): ICD-10-CM

## 2025-05-28 DIAGNOSIS — A41.9 SEVERE SEPSIS (HCC): ICD-10-CM

## 2025-05-28 LAB
ANION GAP SERPL CALC-SCNC: 8 MMOL/L (ref 7–16)
BASOPHILS # BLD: 0.03 K/UL (ref 0–0.2)
BASOPHILS NFR BLD: 0.3 % (ref 0–2)
BUN SERPL-MCNC: 15 MG/DL (ref 8–23)
CALCIUM SERPL-MCNC: 8 MG/DL (ref 8.8–10.2)
CHLORIDE SERPL-SCNC: 110 MMOL/L (ref 98–107)
CO2 SERPL-SCNC: 23 MMOL/L (ref 20–29)
CREAT SERPL-MCNC: 0.99 MG/DL (ref 0.6–1.1)
DIFFERENTIAL METHOD BLD: ABNORMAL
EOSINOPHIL # BLD: 0.21 K/UL (ref 0–0.8)
EOSINOPHIL NFR BLD: 2 % (ref 0.5–7.8)
ERYTHROCYTE [DISTWIDTH] IN BLOOD BY AUTOMATED COUNT: 13.8 % (ref 11.9–14.6)
GLUCOSE SERPL-MCNC: 130 MG/DL (ref 70–99)
HCT VFR BLD AUTO: 29 % (ref 35.8–46.3)
HGB BLD-MCNC: 9.3 G/DL (ref 11.7–15.4)
IMM GRANULOCYTES # BLD AUTO: 0.05 K/UL (ref 0–0.5)
IMM GRANULOCYTES NFR BLD AUTO: 0.5 % (ref 0–5)
LACTATE SERPL-SCNC: 0.5 MMOL/L (ref 0.5–2)
LYMPHOCYTES # BLD: 1.06 K/UL (ref 0.5–4.6)
LYMPHOCYTES NFR BLD: 10.2 % (ref 13–44)
MCH RBC QN AUTO: 28.6 PG (ref 26.1–32.9)
MCHC RBC AUTO-ENTMCNC: 32.1 G/DL (ref 31.4–35)
MCV RBC AUTO: 89.2 FL (ref 82–102)
MONOCYTES # BLD: 0.94 K/UL (ref 0.1–1.3)
MONOCYTES NFR BLD: 9 % (ref 4–12)
NEUTS SEG # BLD: 8.14 K/UL (ref 1.7–8.2)
NEUTS SEG NFR BLD: 78 % (ref 43–78)
NRBC # BLD: 0 K/UL (ref 0–0.2)
PLATELET # BLD AUTO: 229 K/UL (ref 150–450)
PMV BLD AUTO: 10.1 FL (ref 9.4–12.3)
POTASSIUM SERPL-SCNC: 3.9 MMOL/L (ref 3.5–5.1)
RBC # BLD AUTO: 3.25 M/UL (ref 4.05–5.2)
SODIUM SERPL-SCNC: 141 MMOL/L (ref 136–145)
WBC # BLD AUTO: 10.4 K/UL (ref 4.3–11.1)

## 2025-05-28 PROCEDURE — 36415 COLL VENOUS BLD VENIPUNCTURE: CPT

## 2025-05-28 PROCEDURE — 80048 BASIC METABOLIC PNL TOTAL CA: CPT

## 2025-05-28 PROCEDURE — 94640 AIRWAY INHALATION TREATMENT: CPT

## 2025-05-28 PROCEDURE — 85025 COMPLETE CBC W/AUTO DIFF WBC: CPT

## 2025-05-28 PROCEDURE — 1100000000 HC RM PRIVATE

## 2025-05-28 PROCEDURE — 83605 ASSAY OF LACTIC ACID: CPT

## 2025-05-28 PROCEDURE — 97535 SELF CARE MNGMENT TRAINING: CPT

## 2025-05-28 PROCEDURE — 2500000003 HC RX 250 WO HCPCS: Performed by: INTERNAL MEDICINE

## 2025-05-28 PROCEDURE — 2580000003 HC RX 258: Performed by: INTERNAL MEDICINE

## 2025-05-28 PROCEDURE — 6360000002 HC RX W HCPCS: Performed by: INTERNAL MEDICINE

## 2025-05-28 PROCEDURE — 2700000000 HC OXYGEN THERAPY PER DAY

## 2025-05-28 PROCEDURE — 97165 OT EVAL LOW COMPLEX 30 MIN: CPT

## 2025-05-28 PROCEDURE — 94760 N-INVAS EAR/PLS OXIMETRY 1: CPT

## 2025-05-28 PROCEDURE — 6370000000 HC RX 637 (ALT 250 FOR IP): Performed by: INTERNAL MEDICINE

## 2025-05-28 RX ADMIN — LEVOTHYROXINE SODIUM 112 MCG: 0.11 TABLET ORAL at 04:17

## 2025-05-28 RX ADMIN — SODIUM CHLORIDE: 0.9 INJECTION, SOLUTION INTRAVENOUS at 16:49

## 2025-05-28 RX ADMIN — SODIUM CHLORIDE, PRESERVATIVE FREE 10 ML: 5 INJECTION INTRAVENOUS at 20:46

## 2025-05-28 RX ADMIN — SODIUM CHLORIDE 3000 MG: 9 INJECTION, SOLUTION INTRAVENOUS at 03:34

## 2025-05-28 RX ADMIN — BUDESONIDE INHALATION 500 MCG: 0.5 SUSPENSION RESPIRATORY (INHALATION) at 21:01

## 2025-05-28 RX ADMIN — APIXABAN 2.5 MG: 5 TABLET, FILM COATED ORAL at 20:38

## 2025-05-28 RX ADMIN — SODIUM CHLORIDE 3000 MG: 9 INJECTION, SOLUTION INTRAVENOUS at 16:50

## 2025-05-28 RX ADMIN — APIXABAN 2.5 MG: 5 TABLET, FILM COATED ORAL at 09:03

## 2025-05-28 RX ADMIN — SODIUM CHLORIDE 3000 MG: 9 INJECTION, SOLUTION INTRAVENOUS at 09:05

## 2025-05-28 RX ADMIN — SODIUM CHLORIDE 3000 MG: 9 INJECTION, SOLUTION INTRAVENOUS at 21:51

## 2025-05-28 RX ADMIN — SODIUM CHLORIDE: 0.9 INJECTION, SOLUTION INTRAVENOUS at 02:35

## 2025-05-28 RX ADMIN — BUDESONIDE INHALATION 500 MCG: 0.5 SUSPENSION RESPIRATORY (INHALATION) at 07:15

## 2025-05-28 RX ADMIN — FOLIC ACID 1000 MCG: 1 TABLET ORAL at 09:02

## 2025-05-28 ASSESSMENT — PAIN - FUNCTIONAL ASSESSMENT: PAIN_FUNCTIONAL_ASSESSMENT: ACTIVITIES ARE NOT PREVENTED

## 2025-05-28 ASSESSMENT — PAIN DESCRIPTION - LOCATION
LOCATION: HAND
LOCATION: HAND

## 2025-05-28 ASSESSMENT — PAIN DESCRIPTION - DESCRIPTORS: DESCRIPTORS: ACHING

## 2025-05-28 ASSESSMENT — PAIN SCALES - GENERAL
PAINLEVEL_OUTOF10: 1
PAINLEVEL_OUTOF10: 1

## 2025-05-28 ASSESSMENT — PAIN DESCRIPTION - PAIN TYPE: TYPE: ACUTE PAIN

## 2025-05-28 ASSESSMENT — PAIN DESCRIPTION - ORIENTATION: ORIENTATION: RIGHT

## 2025-05-28 NOTE — PROGRESS NOTES
ACUTE OCCUPATIONAL THERAPY GOALS:   (Developed with and agreed upon by patient and/or caregiver.)  1. Pt will complete functional mobility for ADLs independently  2. Pt will complete grooming and hygiene at sink independently  3. Pt will demonstrate independence with HEP to promote increased RUE ROM, coordination, and functional use for ADLs    Goals met      OCCUPATIONAL THERAPY Initial Assessment, Daily Note, and Discharge       OT Visit Days: 1  Acknowledge Orders  Time  OT Charge Capture  Rehab Caseload Tracker      Melonie Spangler is a 85 y.o. female   PRIMARY DIAGNOSIS: Severe sepsis (HCC)  Cellulitis of right upper extremity [L03.113]  Cat bite, initial encounter [W55.01XA]  Severe sepsis (HCC) [A41.9, R65.20]       Reason for Referral: Other lack of cordination (R27.8)  Inpatient: Payor: MEDICARE / Plan: MEDICARE PART A AND B / Product Type: *No Product type* /     ASSESSMENT:     REHAB RECOMMENDATIONS:   Recommendation to date pending progress:  Setting:  No further skilled occupational therapy after discharge from hospital    Equipment:    None     ASSESSMENT:  Ms. Spangler was admitted with R hand cellulitis d/t cat bite. Pt is R hand dominant, presented with mild to moderate edema in R hand and with slightly < ROM and coordination. Pt demonstrates functional use of R hand and remains independent with ADLs and with mobility for ADLs. Pt educated on HEP to promote > ROM, coordination, and edema reduction and returned demonstration independently. Pt does not require further skilled OT services at this time, no d/c needs.      [facility] AM-PAC™ “6 Clicks” Daily Activity Inpatient Short Form:    AM-PAC Daily Activity - Inpatient   How much help is needed for putting on and taking off regular lower body clothing?: None  How much help is needed for bathing (which includes washing, rinsing, drying)?: None  How much help is needed for toileting (which includes using toilet, bedpan, or urinal)?: None  How  Assistance, Mod=Moderate Assistance, Max=Maximal Assistance, Total=Total Assistance, NT=Not Tested    ACTIVITIES OF DAILY LIVING: I Mod I S SBA CGA Min Mod Max Total NT Comments   BASIC ADLs:              Upper Body Bathing  [] [] [] [] [] [] [] [] [] []     Lower Body Bathing [] [] [] [] [] [] [] [] [] []     Toileting [] [] [] [] [] [] [] [] [] []    Upper Body Dressing [] [] [] [] [] [] [] [] [] []    Lower Body Dressing [] [] [] [] [] [] [] [] [] []    Feeding [] [] [] [] [] [] [] [] [] []    Grooming [x] [] [] [] [] [] [] [] [] []    Personal Device Care [] [] [] [] [] [] [] [] [] []    Functional Mobility [x] [] [] [] [] [] [] [] [] []    I=Independent, Mod I=Modified Independent, S=Supervision/Setup, SBA=Standby Assistance, CGA=Contact Guard Assistance, Min=Minimal Assistance, Mod=Moderate Assistance, Max=Maximal Assistance, Total=Total Assistance, NT=Not Tested      TREATMENT:     EVALUATION: LOW COMPLEXITY: (Untimed Charge)  The initial evaluation charge encompasses clinical chart review, objective assessment, interpretation of assessment, and skilled monitoring of the patient's response to treatment in order to develop a plan of care.     TREATMENT:   Self Care (8 minutes): Patient participated in grooming, functional mobility, bed mobility, functional transfer, bathroom mobility, and compensatory technique in standing with no verbal cueing to increase independence. The patient was educated on role of occupational therapy and compensatory technique during ADL  and patient verbalized understanding.     TREATMENT GRID:  N/A    AFTER TREATMENT PRECAUTIONS: Bed, Call light within reach, Needs within reach, and RN notified    INTERDISCIPLINARY COLLABORATION:  RN/ PCT    EDUCATION:  OT educated patient  on role of occupational therapy and plan of care, OT discharge recommendations, and home exercise program. Patient will not need continued education at next session.         TOTAL TREATMENT DURATION AND  TIME:  Time In: 1432  Time Out: 1451  Minutes: 19    Ivana Chong, OT

## 2025-05-28 NOTE — NURSE NAVIGATOR
This RN Navigator introduced self to patient and/or family at patient bedside. Patient informed that RN Navigator will be following patient for at least 30 days post discharge to act as a resource for any needs that may arise following discharge in relation to their sepsis diagnosis and treatment.     Patient verbalizes understanding of generalized education of sepsis disease process, s/s to monitor for and when to contact physician or visit Emergency Department.   Sepsis education sheet given to patient with this RN Navigator contact information. Patient informed they will be contacted 48-72 hours following discharge.   Contact information verified by patient and/or family member. RN Navigator will continue to follow.       Met with patient and family at bedside. Patient lives in [place] and is in [place] visiting family. Demographics and that patient has a PCP verified. Patient A&Ox3, lives alone and is independent at baseline, continues to drive. No new needs identified at this time.

## 2025-05-28 NOTE — PROGRESS NOTES
Did not get much sleep.  NPO.  IVF infusing.  No c/o pain.  Right hand slight red, slight swelling.  Call light in reach.

## 2025-05-28 NOTE — WOUND CARE
Right hand scratch/ bite  from cat with edema and erythema. Seen by ortho, needs to elevate, pillow provided. The erythema intensity is decreasing, is outside of marked lines, needs to elevate elbow as well. Recommend to keep clean with antibacterial soap and water. No bandage needed. Ortho plans to assess again tomorrow per notes.

## 2025-05-28 NOTE — CONSULTS
Progress Note    Patient: Melonie Spangler MRN: [id number]  SSN: [id number]    YOB: 1940  Age: 85 y.o.  Sex: female      Admit Date: 5/27/2025    LOS: 1 day     Subjective:     Patient was admitted and the orthopedic team was consulted secondary to right hand swelling and tenderness after a cat bite.  She is from [place] and is just visiting family here.  She was having a good bit of swelling redness in her right hand.  She says that it is definitely better this morning.  The swelling is sort of more in her forearm but she has really been working to try to keep it elevated overnight.  She denies any other issues besides her right hand    Objective:     Vitals:    05/27/25 2046 05/28/25 0332 05/28/25 0715 05/28/25 0751   BP:  (!) 124/56  (!) 136/56   Pulse: 85 91 78 79   Resp: 17 17 18 20   Temp:  99.1 °F (37.3 °C)  98.1 °F (36.7 °C)   TempSrc:  Oral  Oral   SpO2: 95% 97% 96% 97%   Weight:       Height:            Intake and Output:  Current Shift: 05/28 0701 - 05/28 1900  In: 240 [P.O.:240]  Out: -   Last three shifts: 05/26 1901 - 05/28 0700  In: 2250.2 [P.O.:365; I.V.:585.8]  Out: -     alert and oriented to person place time and situation  Skin -she does have a good bit of swelling in her right hand but she says it is definitely better.  She does have a small open wound on the dorsum of her hand where she says the cat did bite her.  She has a little bit of redness and swelling into her forearm but this is not that dramatic.  I do not see any obvious fluctuant area either around her hand or her forearm  Motor and sensory function intact in RIGHT UPPER extremity  Pulses palpable in RIGHT UPPER extremity       Lab/Data Review:  Recent Labs     05/28/25  0427   HGB 9.3*   HCT 29.0*          Assessment:     Cellulitis right upper extremity    Plan:     It does seem like she is having some clinical improvement on her antibiotics.  She is having more swelling and redness up into her forearm but I think  this is from her elevation which I would encourage her to keep doing.  It does seem to be very reasonable to allow her to eat this morning and just continue her antibiotics to make sure she continues to improve.  I do not see there is anything to drain surgically right now.  But I did explain to her as I think it probably wise to let her get antibiotics here in the hospital via IV for the rest of the day and I probably would make her nothing to eat or drink after midnight again tonight and to see her first thing in the morning make sure she is continuing to improve.  If she is not continuing to improve or has any increased pain or fluctuance then we could reconsider but I do not think she would need any surgical intervention right now I think she is very happy to do this.  Have also given her a pillow to help with her elevation so hopefully this can help her be a little more comfortable with elevating her hand as well.    Signed By: Bakari Perez MD     May 28, 2025

## 2025-05-28 NOTE — PROGRESS NOTES
Hospitalist Progress Note   Admit Date:  2025  1:45 PM   Name:  Melonie Spangler   Age:  85 y.o.  Sex:  female  :  1940   MRN:  [id number]   Room:  [facility]    Presenting/Chief Complaint: Animal Bite     Reason(s) for Admission: Cellulitis of right upper extremity [L03.113]  Cat bite, initial encounter [W55.01XA]  Severe sepsis (HCC) [A41.9, R65.20]     Hospital Course:   Melonie Spangler is a 85 y.o. female with medical history of breast cancer, COPD, hypothyroidism, DVT on Eliquis admitted on  following a cat bite of the right wrist and admitted for sepsis due to cellulitis of right upper extremity.  Lactic acid was elevated on admission with a leukocytosis of 13.3 K.      Subjective & 24hr Events:   Patient seen and examined at bedside.  She is alert and awake, AO x 3, on room air.  Patient reports of having some swelling and redness in right upper extremity but denies any pain in it.  No fever, chills nausea vomiting or diarrhea.  No shortness of breath or palpitations.    ROS:  10 point review of system is negative except for what mentioned above.      Assessment & Plan:     Severe sepsis  Cat bite  Lactic acidosis  -  Sepsis is resolving.  Plan to continue IV Unasyn every 6 hours.  Blood cultures negative to date.  Lactic acid has been normalized.  Continue IV fluids.       COPD  Lungs clear to auscultation  Patient states she wears oxygen at night and will continue during hospitalization  continue home inhalers     DVT on Eliquis  Eliquis 2.5 mg twice daily     Hypothyroidism  Continue levothyroxine     DNR         Anticipated Discharge Arrangements:   Therapy has not evaluated yet    PT/OT evals ordered?  Therapy evals ordered  Diet:  ADULT DIET; Regular  VTE prophylaxis: Already on anticoagulation  Code status: DNR      Non-peripheral Lines and Tubes (if present):          Telemetry (if present):  Cardiac/Telemetry Monitor On: No        Hospital Problems:  Principal Problem:    Severe  Route Frequency    budesonide (PULMICORT) nebulizer suspension 500 mcg  500 mcg Nebulization BID    folic acid (FOLVITE) tablet 1,000 mcg  1,000 mcg Oral Daily    levothyroxine (SYNTHROID) tablet 112 mcg  112 mcg Oral Daily    sodium chloride flush 0.9 % injection 5-40 mL  5-40 mL IntraVENous 2 times per day    sodium chloride flush 0.9 % injection 5-40 mL  5-40 mL IntraVENous PRN    0.9 % sodium chloride infusion   IntraVENous PRN    potassium chloride (KLOR-CON M) extended release tablet 40 mEq  40 mEq Oral PRN    Or    potassium bicarb-citric acid (EFFER-K) effervescent tablet 40 mEq  40 mEq Oral PRN    Or    potassium chloride 10 mEq/100 mL IVPB (Peripheral Line)  10 mEq IntraVENous PRN    magnesium sulfate 2000 mg in 50 mL IVPB premix  2,000 mg IntraVENous PRN    ondansetron (ZOFRAN-ODT) disintegrating tablet 4 mg  4 mg Oral Q8H PRN    Or    ondansetron (ZOFRAN) injection 4 mg  4 mg IntraVENous Q6H PRN    polyethylene glycol (GLYCOLAX) packet 17 g  17 g Oral Daily PRN    acetaminophen (TYLENOL) tablet 650 mg  650 mg Oral Q6H PRN    Or    acetaminophen (TYLENOL) suppository 650 mg  650 mg Rectal Q6H PRN    0.9 % sodium chloride infusion   IntraVENous Continuous    ampicillin-sulbactam (UNASYN) 3,000 mg in sodium chloride 0.9 % 100 mL IVPB (addEASE)  3,000 mg IntraVENous Q6H    albuterol (PROVENTIL) (2.5 MG/3ML) 0.083% nebulizer solution 2.5 mg  2.5 mg Nebulization Q4H PRN    apixaban (ELIQUIS) tablet 2.5 mg  2.5 mg Oral BID    oxyCODONE (ROXICODONE) immediate release tablet 2.5 mg  2.5 mg Oral Q4H PRN    oxyCODONE (ROXICODONE) immediate release tablet 5 mg  5 mg Oral Q4H PRN       Signed:  Tomasa Dominguez MD    Part of this note may have been written by using a voice dictation software.  The note has been proof read but may still contain some grammatical/other typographical errors.

## 2025-05-28 NOTE — PROGRESS NOTES
Pt edema & erythema had gone from ending at her wrist from this RN previous shift to now her elbow, MD aware & area demarcated. She is resting in the chair without any complaints. Hourly rounds completed, right arm elevated and all needs met. Bed/chair are low, locked,call light is in reach and pt is encouraged to call for assistance.

## 2025-05-28 NOTE — PROGRESS NOTES
Per VO Dr. Perez order was placed for a Arm Cradle elevation pillow and was taken to the pt  while IP by Dr. Perez. LH

## 2025-05-29 PROCEDURE — 6360000002 HC RX W HCPCS: Performed by: INTERNAL MEDICINE

## 2025-05-29 PROCEDURE — 97530 THERAPEUTIC ACTIVITIES: CPT

## 2025-05-29 PROCEDURE — 1100000000 HC RM PRIVATE

## 2025-05-29 PROCEDURE — 6370000000 HC RX 637 (ALT 250 FOR IP): Performed by: INTERNAL MEDICINE

## 2025-05-29 PROCEDURE — 2580000003 HC RX 258: Performed by: INTERNAL MEDICINE

## 2025-05-29 PROCEDURE — 97161 PT EVAL LOW COMPLEX 20 MIN: CPT

## 2025-05-29 PROCEDURE — 94640 AIRWAY INHALATION TREATMENT: CPT

## 2025-05-29 PROCEDURE — 94761 N-INVAS EAR/PLS OXIMETRY MLT: CPT

## 2025-05-29 PROCEDURE — 2500000003 HC RX 250 WO HCPCS: Performed by: INTERNAL MEDICINE

## 2025-05-29 RX ADMIN — LEVOTHYROXINE SODIUM 112 MCG: 0.11 TABLET ORAL at 05:07

## 2025-05-29 RX ADMIN — SODIUM CHLORIDE, PRESERVATIVE FREE 10 ML: 5 INJECTION INTRAVENOUS at 21:08

## 2025-05-29 RX ADMIN — SODIUM CHLORIDE 3000 MG: 9 INJECTION, SOLUTION INTRAVENOUS at 08:55

## 2025-05-29 RX ADMIN — SODIUM CHLORIDE 3000 MG: 9 INJECTION, SOLUTION INTRAVENOUS at 20:32

## 2025-05-29 RX ADMIN — SODIUM CHLORIDE 3000 MG: 9 INJECTION, SOLUTION INTRAVENOUS at 03:47

## 2025-05-29 RX ADMIN — APIXABAN 2.5 MG: 5 TABLET, FILM COATED ORAL at 08:51

## 2025-05-29 RX ADMIN — BUDESONIDE INHALATION 500 MCG: 0.5 SUSPENSION RESPIRATORY (INHALATION) at 07:27

## 2025-05-29 RX ADMIN — SODIUM CHLORIDE, PRESERVATIVE FREE 10 ML: 5 INJECTION INTRAVENOUS at 08:51

## 2025-05-29 RX ADMIN — APIXABAN 2.5 MG: 5 TABLET, FILM COATED ORAL at 20:34

## 2025-05-29 RX ADMIN — BUDESONIDE INHALATION 500 MCG: 0.5 SUSPENSION RESPIRATORY (INHALATION) at 19:15

## 2025-05-29 RX ADMIN — SODIUM CHLORIDE 3000 MG: 9 INJECTION, SOLUTION INTRAVENOUS at 16:44

## 2025-05-29 RX ADMIN — FOLIC ACID 1000 MCG: 1 TABLET ORAL at 08:51

## 2025-05-29 NOTE — PLAN OF CARE
Problem: Discharge Planning  Goal: Discharge to home or other facility with appropriate resources  5/28/2025 2007 by Chevalier, [place], RN  Outcome: Progressing  Flowsheets (Taken 5/28/2025 2007)  Discharge to home or other facility with appropriate resources: Identify barriers to discharge with patient and caregiver  5/28/2025 1030 by Kaci Rice RN  Outcome: Progressing     Problem: Pain  Goal: Verbalizes/displays adequate comfort level or baseline comfort level  5/28/2025 2007 by Chevalier, [place], RN  Outcome: Progressing  Flowsheets (Taken 5/28/2025 2007)  Verbalizes/displays adequate comfort level or baseline comfort level:   Encourage patient to monitor pain and request assistance   Assess pain using appropriate pain scale   Administer analgesics based on type and severity of pain and evaluate response  5/28/2025 1030 by Kaci Rice RN  Outcome: Progressing     Problem: Safety - Adult  Goal: Free from fall injury  5/28/2025 2007 by Chevalier, [place], RN  Outcome: Progressing  Flowsheets (Taken 5/28/2025 2007)  Free From Fall Injury: Instruct family/caregiver on patient safety  5/28/2025 1030 by Kaci Rice RN  Outcome: Progressing     Problem: Skin/Tissue Integrity - Adult  Goal: Skin integrity remains intact  5/28/2025 2007 by Chevalier, [place], RN  Outcome: Progressing  Flowsheets (Taken 5/28/2025 2007)  Skin Integrity Remains Intact: Monitor for areas of redness and/or skin breakdown  5/28/2025 1030 by Kaci Rice RN  Outcome: Progressing  Goal: Incisions, wounds, or drain sites healing without S/S of infection  5/28/2025 2007 by Chevalier, [place], RN  Outcome: Progressing  Flowsheets (Taken 5/28/2025 2007)  Incisions, Wounds, or Drain Sites Healing Without Sign and Symptoms of Infection: Implement wound care per orders  5/28/2025 1030 by Kaci Rice RN  Outcome: Progressing

## 2025-05-29 NOTE — PROGRESS NOTES
ACUTE PHYSICAL THERAPY GOALS:   (Developed with and agreed upon by patient and/or caregiver.)  Pt will perform all bed mobility and transfers (I) c use of LRAD/external supports as needed and no LOB or miss-steps in 7 therapy sessions.  Pt will ambulate 250 ft under (S) with no LOB or miss-steps and breaks as needed in 7 therapy sessions.  Pt will perform standing dynamic balance activities with minimal postural sway in 7 therapy sessions.    ALL GOALS MET on 5/29/25 eval    PHYSICAL THERAPY Initial Assessment, Daily Note, Discharge, and AM  (Link to Caseload Tracking: PT Visit Days : 1  Acknowledge Orders  Time In/Out  PT Charge Capture  Rehab Caseload Tracker    Melonie Spangler is a 85 y.o. female   PRIMARY DIAGNOSIS: Severe sepsis (HCC)  Cellulitis of right upper extremity [L03.113]  Cat bite, initial encounter [W55.01XA]  Severe sepsis (HCC) [A41.9, R65.20]       Reason for Referral: Other abnormalities of gait and mobility (R26.89)  Inpatient: Payor: MEDICARE / Plan: MEDICARE PART A AND B / Product Type: *No Product type* /     ASSESSMENT:     REHAB RECOMMENDATIONS:   Recommendation to date pending progress:  Setting:  No further skilled physical therapy after discharge from hospital    Equipment:    None     ASSESSMENT:  Ms. Spangler Is a 85 y.o. female presenting to after being admitted on 5/2725 for cat bite on R wrist associated with increased redness and swelling. At time of initial evaluation, pt presents at approximate baseline LOF with no major deficits beyond slightly diminished activity tolerance limiting her overall functional mobility. Today, pt performed all mobility at Mod (I)/(S) levels including ambulation of 250'x1. Of note, pt ambulating with slightly decreased rizwan and minor, self-corrected, path deviations although she ultimately did well to avoid any LOB/ miss-steps. Furthermore, pt performed all activity on RA and denied any dizziness, lightheadedness or SOB while moving about on their  assessment, interpretation of assessment, and skilled monitoring of the patient's response to treatment in order to develop a plan of care.     TREATMENT:   Therapeutic Activity (8 Minutes): Therapeutic activity included Ambulation on level ground and Standing balance to improve functional Activity tolerance and Mobility.    TREATMENT GRID:  N/A    AFTER TREATMENT PRECAUTIONS: Bed/Chair Locked, Call light within reach, Chair, Needs within reach, RN notified, and Visitors at bedside    INTERDISCIPLINARY COLLABORATION:  RN/ PCT and PT/ PTA    EDUCATION: Education Given To: Patient  Education Provided: Role of Therapy  Educated patient and/or family/caregiver on the following: Activity Pacing    TIME IN/OUT:  Time In: 0806  Time Out: 0816  Minutes: 10    Andrzej Hudson PT

## 2025-05-29 NOTE — PROGRESS NOTES
Patient medicated per MAR. No c/o pain this shift. Redness of RUE has been outlined. Pt has been NPO since MN. She has been placed on 2L NC for nighttime/sleep, which is her basline, spo2 running between 92-96%. All known needs met at this time. Bed locked and lowered with call light within reach.

## 2025-05-29 NOTE — PROGRESS NOTES
Hospitalist Progress Note   Admit Date:  2025  1:45 PM   Name:  Melonie Spangler   Age:  85 y.o.  Sex:  female  :  1940   MRN:  [id number]   Room:  [facility]    Presenting/Chief Complaint: Animal Bite     Reason(s) for Admission: Cellulitis of right upper extremity [L03.113]  Cat bite, initial encounter [W55.01XA]  Severe sepsis (HCC) [A41.9, R65.20]     Hospital Course:   Melonie Spangler is a 85 y.o. female with medical history of breast cancer, COPD, hypothyroidism, DVT on Eliquis admitted on  following a cat bite of the right wrist and admitted for sepsis due to cellulitis of right upper extremity.  Lactic acid was elevated on admission with a leukocytosis of 13.3 K.      Subjective & 24hr Events:   Patient seen and examined at bedside.  She is alert and awake, AO x 3, on room air.  Denies any chest pain, nausea vomiting, fever chills or night sweats.  Patient continues to feel better in right upper extremity cellulitis.  Does have some swelling in arm however redness and warmth has improved significantly.  No other overnight events.    ROS:  10 point review of system is negative except for what mentioned above.      Assessment & Plan:     Severe sepsis  Cat bite  Lactic acidosis  -  Sepsis has resolved.  Plan to continue IV Unasyn every 6 hours.  Blood cultures negative to date.  Lactic acid has been normalized.  Stopped IV fluids.  Will continue Augmentin for 7-8 more days after discharge.       COPD  Lungs clear to auscultation  Patient states she wears oxygen at night and will continue during hospitalization  continue home inhalers     DVT on Eliquis  Eliquis 2.5 mg twice daily     Hypothyroidism  Continue levothyroxine     DNR         Anticipated Discharge Arrangements:   Discharge to home tomorrow.    PT/OT evals ordered?  Therapy evals ordered  Diet:  ADULT DIET; Regular; No Added Salt (3-4 gm)  VTE prophylaxis: Already on anticoagulation  Code status: DNR      Non-peripheral Lines and  Daily    levothyroxine (SYNTHROID) tablet 112 mcg  112 mcg Oral Daily    sodium chloride flush 0.9 % injection 5-40 mL  5-40 mL IntraVENous 2 times per day    sodium chloride flush 0.9 % injection 5-40 mL  5-40 mL IntraVENous PRN    0.9 % sodium chloride infusion   IntraVENous PRN    potassium chloride (KLOR-CON M) extended release tablet 40 mEq  40 mEq Oral PRN    Or    potassium bicarb-citric acid (EFFER-K) effervescent tablet 40 mEq  40 mEq Oral PRN    Or    potassium chloride 10 mEq/100 mL IVPB (Peripheral Line)  10 mEq IntraVENous PRN    magnesium sulfate 2000 mg in 50 mL IVPB premix  2,000 mg IntraVENous PRN    ondansetron (ZOFRAN-ODT) disintegrating tablet 4 mg  4 mg Oral Q8H PRN    Or    ondansetron (ZOFRAN) injection 4 mg  4 mg IntraVENous Q6H PRN    polyethylene glycol (GLYCOLAX) packet 17 g  17 g Oral Daily PRN    acetaminophen (TYLENOL) tablet 650 mg  650 mg Oral Q6H PRN    Or    acetaminophen (TYLENOL) suppository 650 mg  650 mg Rectal Q6H PRN    ampicillin-sulbactam (UNASYN) 3,000 mg in sodium chloride 0.9 % 100 mL IVPB (addEASE)  3,000 mg IntraVENous Q6H    albuterol (PROVENTIL) (2.5 MG/3ML) 0.083% nebulizer solution 2.5 mg  2.5 mg Nebulization Q4H PRN    apixaban (ELIQUIS) tablet 2.5 mg  2.5 mg Oral BID    oxyCODONE (ROXICODONE) immediate release tablet 2.5 mg  2.5 mg Oral Q4H PRN    oxyCODONE (ROXICODONE) immediate release tablet 5 mg  5 mg Oral Q4H PRN       Signed:  Tomasa Dominguez MD    Part of this note may have been written by using a voice dictation software.  The note has been proof read but may still contain some grammatical/other typographical errors.

## 2025-05-29 NOTE — CARE COORDINATION
CM met with Ms. Spangler and her daughter Ana in room 617 to discuss discharge planning.  She is inpatient status for cellulitis to her right upper extremity related to a cat bite.    Prior to this, Ms. Spangler was living alone in [place], [place].  Her  of 64 years  in .  She is in [place], [place] now visiting her daughter Ana.  Ms. Spangler is independent with ADLs.      At discharge and when rested, they plan to drive back to [place] together.  No additional discharge needs voiced or indentified, but CM will follow along.       [phone]   Service Assessment   Patient Orientation Alert and Oriented   Cognition Alert   History Provided By Patient   Primary Caregiver Self   Accompanied By/Relationship daughter Ana   Support Systems Children   PCP Verified by CM Yes  (in [place])   Prior Functional Level Independent in ADLs/IADLs   Current Functional Level Independent in ADLs/IADLs;Other (see comment)  (cat bite right hand)   Can patient return to prior living arrangement Yes   Ability to make needs known: Good   Family able to assist with home care needs: Yes   Would you like for me to discuss the discharge plan with any other family members/significant others, and if so, who? No   Condition of Participation: Discharge Planning   The Plan for Transition of Care is related to the following treatment goals: home when stable

## 2025-05-29 NOTE — PLAN OF CARE
Problem: Discharge Planning  Goal: Discharge to home or other facility with appropriate resources  5/29/2025 1954 by Chevalier, [place], RN  Outcome: Progressing  5/29/2025 1742 by Kaci Rice RN  Outcome: Progressing     Problem: Pain  Goal: Verbalizes/displays adequate comfort level or baseline comfort level  5/29/2025 1954 by Chevalier, [place], RN  Outcome: Progressing  Flowsheets (Taken 5/29/2025 1954)  Verbalizes/displays adequate comfort level or baseline comfort level:   Encourage patient to monitor pain and request assistance   Assess pain using appropriate pain scale  5/29/2025 1742 by Kaci Rice RN  Outcome: Progressing     Problem: Safety - Adult  Goal: Free from fall injury  5/29/2025 1954 by Chevalier, [place], RN  Outcome: Progressing  Flowsheets (Taken 5/29/2025 1954)  Free From Fall Injury: Instruct family/caregiver on patient safety  5/29/2025 1742 by Kaci Rice RN  Outcome: Progressing     Problem: Skin/Tissue Integrity - Adult  Goal: Skin integrity remains intact  5/29/2025 1954 by Chevalier, [place], RN  Outcome: Progressing  Flowsheets (Taken 5/29/2025 1954)  Skin Integrity Remains Intact: Monitor for areas of redness and/or skin breakdown  5/29/2025 1742 by Kaci Rice RN  Outcome: Progressing  Goal: Incisions, wounds, or drain sites healing without S/S of infection  5/29/2025 1954 by Chevalier, [place], RN  Outcome: Progressing  Flowsheets (Taken 5/29/2025 1954)  Incisions, Wounds, or Drain Sites Healing Without Sign and Symptoms of Infection: Implement wound care per orders  5/29/2025 1742 by Kaci Rice RN  Outcome: Progressing     Problem: Respiratory - Adult  Goal: Achieves optimal ventilation and oxygenation  5/29/2025 1954 by Chevalier, [place], RN  Outcome: Progressing  Flowsheets (Taken 5/29/2025 1954)  Achieves optimal ventilation and oxygenation:   Assess for changes in respiratory status   Assess for changes in mentation and behavior  5/29/2025 1742 by Dwayne

## 2025-05-30 VITALS
HEART RATE: 83 BPM | RESPIRATION RATE: 18 BRPM | BODY MASS INDEX: 26.66 KG/M2 | OXYGEN SATURATION: 100 % | TEMPERATURE: 97.3 F | WEIGHT: 160 LBS | HEIGHT: 65 IN | DIASTOLIC BLOOD PRESSURE: 67 MMHG | SYSTOLIC BLOOD PRESSURE: 141 MMHG

## 2025-05-30 PROCEDURE — 2580000003 HC RX 258: Performed by: INTERNAL MEDICINE

## 2025-05-30 PROCEDURE — 6360000002 HC RX W HCPCS: Performed by: INTERNAL MEDICINE

## 2025-05-30 PROCEDURE — 6370000000 HC RX 637 (ALT 250 FOR IP): Performed by: INTERNAL MEDICINE

## 2025-05-30 PROCEDURE — 94760 N-INVAS EAR/PLS OXIMETRY 1: CPT

## 2025-05-30 PROCEDURE — 2500000003 HC RX 250 WO HCPCS: Performed by: INTERNAL MEDICINE

## 2025-05-30 PROCEDURE — 94640 AIRWAY INHALATION TREATMENT: CPT

## 2025-05-30 RX ORDER — SACCHAROMYCES BOULARDII 250 MG
250 CAPSULE ORAL 2 TIMES DAILY
Qty: 14 CAPSULE | Refills: 0 | Status: SHIPPED | OUTPATIENT
Start: 2025-05-30 | End: 2025-06-06

## 2025-05-30 RX ADMIN — SODIUM CHLORIDE 3000 MG: 9 INJECTION, SOLUTION INTRAVENOUS at 09:52

## 2025-05-30 RX ADMIN — FOLIC ACID 1000 MCG: 1 TABLET ORAL at 08:42

## 2025-05-30 RX ADMIN — LEVOTHYROXINE SODIUM 112 MCG: 0.11 TABLET ORAL at 05:29

## 2025-05-30 RX ADMIN — BUDESONIDE INHALATION 500 MCG: 0.5 SUSPENSION RESPIRATORY (INHALATION) at 07:09

## 2025-05-30 RX ADMIN — SODIUM CHLORIDE, PRESERVATIVE FREE 10 ML: 5 INJECTION INTRAVENOUS at 08:43

## 2025-05-30 RX ADMIN — SODIUM CHLORIDE 3000 MG: 9 INJECTION, SOLUTION INTRAVENOUS at 03:26

## 2025-05-30 RX ADMIN — APIXABAN 2.5 MG: 5 TABLET, FILM COATED ORAL at 08:42

## 2025-05-30 NOTE — PROGRESS NOTES
Progress Note    Patient: Melonie Spangler MRN: [id number]  SSN: [id number]    YOB: 1940  Age: 85 y.o.  Sex: female      Admit Date: 5/27/2025    LOS: 3 days     Subjective:     Doing well. Ready to go home today. Does have some swelling, but notes she had lymphedema due to prior surgery.     Objective:     Vitals:    05/29/25 1930 05/30/25 0303 05/30/25 0712 05/30/25 0749   BP: 125/75 (!) 139/59  (!) 141/67   Pulse: 74 78 67 83   Resp: 16 18 16 18   Temp: 98.3 °F (36.8 °C) 97.5 °F (36.4 °C)  97.3 °F (36.3 °C)   TempSrc: Oral Oral  Oral   SpO2: 98% 95% 97% 100%   Weight:       Height:            Intake and Output:  Current Shift: No intake/output data recorded.  Last three shifts: 05/28 1901 - 05/30 0700  In: 642.8 [P.O.:240]  Out: -     alert and oriented to person place time and situation    Right upper Extremity  - no erythema. Mild swelling to forearm.  - Sensation: SILT M/R/U/Ax  - Motor: 5/5 EPL/FPL to DP/EDC Index/FDP index/IO  - Digits warm, well-perfused, brisk cap refill    Lab/Data Review:  Recent Labs     05/28/25  0427   HGB 9.3*   HCT 29.0*          Assessment:     ORTHO INJURIES:  R hand cat bite    ORTHO PROCEDURES:  none  Plan:     Okay for discharge home today. Continue abx.  Wound care:  none    F/up w/ [facility] on as needed basis.    Signed By: AMIRAH Mao     May 30, 2025

## 2025-05-30 NOTE — PLAN OF CARE
Problem: Respiratory - Adult  Goal: Achieves optimal ventilation and oxygenation  5/30/2025 0713 by Ivon Marcus, RCADRIÁN  Outcome: Progressing  Flowsheets (Taken 5/30/2025 0713)  Achieves optimal ventilation and oxygenation:   Assess for changes in respiratory status   Respiratory therapy support as indicated   Assess and instruct to report shortness of breath or any respiratory difficulty   Encourage broncho-pulmonary hygiene including cough, deep breathe, incentive spirometry   Assess for changes in mentation and behavior

## 2025-05-30 NOTE — DISCHARGE SUMMARY
Hospitalist Discharge Summary   Admit Date:  2025  1:45 PM   DC Note date: 2025  Name:  Melonie Spangler   Age:  85 y.o.  Sex:  female  :  1940   MRN:  [id number]   Room:  [facility]  PCP:  No primary care provider on file.    Presenting Complaint: Animal Bite     Initial Admission Diagnosis: Cellulitis of right upper extremity [L03.113]  Cat bite, initial encounter [W55.01XA]  Severe sepsis (HCC) [A41.9, R65.20]     Problem List for this Hospitalization (present on admission):    Principal Problem:    Severe sepsis (HCC)  Active Problems:    Postoperative hypothyroidism    Cat bite    DNR (do not resuscitate)    COPD (chronic obstructive pulmonary disease) (HCC)    History of DVT (deep vein thrombosis)  Resolved Problems:    * No resolved hospital problems. *      Hospital Course:  Patient is an 85-year-old female with history of breast cancer intervention, COPD, hypothyroidism, DVT on Eliquis admitted on  following a cat bite of her right wrist, found to be with sepsis due to cellulitis of right upper extremity.  Lactic acid was elevated on admission with leukocytosis of 13.3 K.  Patient was admitted with IV fluids and IV antibiotics (Unasyn every 6 hours).  Patient's blood cultures have been negative till date.  Patient's redness, warmth and swelling has subsided significantly.  Patient has a prior history of breast cancer on the right with status postlumpectomy and lymph node dissection and has history of lymphedema affecting right upper extremity.  Patient is advised to wear a compression sleeve until swelling has fully resolved.  Patient will complete 7 more days of Augmentin after discharge along with probiotics.  Rest of the hospital course has been uneventful.  Patient was evaluated by orthopedics however did not recommend for any surgical intervention given no obvious fluid collection.  Patient hospital course was uneventful, for further details please review daily progress    Reason for Stopping:         escitalopram (LEXAPRO) 10 MG tablet Comments:   Reason for Stopping:               Some of the medications may be marked as \"stop taking\" by the system; but in reality patient or family reported already being off these meds; defer to outpatient/prescribing providers.      Procedures done this admission:  * No surgery found *    Consults this admission:  IP CONSULT TO ORTHOPEDIC SURGERY    Consultants will arrange their own follow ups, if applicable.    Echocardiogram results:  No results found for this or any previous visit.      Diagnostic Imaging/Tests:   XR HAND RIGHT (MIN 3 VIEWS)  Result Date: 5/28/2025  Osteopenia. Otherwise unremarkable exam. Electronically signed by Cal Velazquez       Labs: Results:       BMP, Mg, Phos Recent Labs     05/27/25  1414 05/28/25  0427    141   K 4.1 3.9    110*   CO2 22 23   ANIONGAP 12 8   BUN 19 15   CREATININE 1.06 0.99   LABGLOM 51* 56*   CALCIUM 8.9 8.0*   GLUCOSE 138* 130*      CBC Recent Labs     05/27/25  1414 05/28/25  0427   WBC 13.3* 10.4   RBC 3.95* 3.25*   HGB 11.6* 9.3*   HCT 36.1 29.0*   MCV 91.4 89.2   MCH 29.4 28.6   MCHC 32.1 32.1   RDW 13.8 13.8    229   MPV 10.1 10.1   NRBC 0.00 0.00   LYMPHOPCT 7.2* 10.2*   MONOPCT 6.4 9.0   BASOPCT 0.2 0.3   IMMGRAN 0.5 0.5   LYMPHSABS 0.96 1.06   EOSABS 0.06 0.21   MONOSABS 0.85 0.94   BASOSABS 0.03 0.03   ABSIMMGRAN 0.06 0.05      LFT No results for input(s): \"BILITOT\", \"BILIDIR\", \"ALKPHOS\", \"AST\", \"ALT\", \"PROTEIN\", \"ALBUMIN\", \"GLOB\" in the last 72 hours.   Cardiac  Lab Results   Component Value Date/Time    TROPHS 5.4 12/30/2021 11:51 AM    TROPHS 5.9 12/30/2021 09:49 AM      Coags No results found for: \"PROTIME\", \"INR\", \"APTT\"   A1c No results found for: \"LABA1C\", \"EAG\"   Lipids No results found for: \"CHOL\", \"HDL\", \"CHOLHDLRATIO\", \"TRIG\"   Thyroid  No results found for: \"TSHELE\", \"GRF8AOT\"     Most Recent UA No results found for: \"COLORU\", \"APPEARANCE\", \"SPECGRAV\",  \"LABPH\", \"PROTEINU\", \"GLUCOSEU\", \"KETUA\", \"BILIRUBINUR\", \"BLOODU\", \"UROBILINOGEN\", \"NITRU\", \"LEUKOCYTESUR\", \"WBCUA\", \"RBCUA\", \"BACTERIA\", \"LABCAST\", \"MUCUS\"     Microbiology:  Results       Procedure Component Value Units Date/Time    Blood Culture 2 [[id number]] Collected: 05/27/25 1609    Order Status: Completed Specimen: Blood Updated: 05/30/25 0542     Special Requests --        LEFT  HAND       Culture NO GROWTH 3 DAYS       Blood Culture 1 [[id number]] Collected: 05/27/25 1547    Order Status: Completed Specimen: Blood Updated: 05/30/25 0542     Special Requests --        LEFT  Antecubital       Culture NO GROWTH 3 DAYS               All Labs from Last 24 Hrs:  No results found for this or any previous visit (from the past 24 hours).    No results for input(s): \"COVID19\" in the last 72 hours.    Recent Vital Data:  Patient Vitals for the past 24 hrs:   Temp Pulse Resp BP SpO2   05/30/25 0749 97.3 °F (36.3 °C) 83 18 (!) 141/67 100 %   05/30/25 0712 -- 67 16 -- 97 %   05/30/25 0303 97.5 °F (36.4 °C) 78 18 (!) 139/59 95 %   05/29/25 1930 98.3 °F (36.8 °C) 74 16 125/75 98 %   05/29/25 1912 -- 74 16 -- 99 %   05/29/25 1520 -- 87 -- (!) 131/53 --   05/29/25 1512 98.2 °F (36.8 °C) 87 16 (!) 142/40 98 %       Oxygen Therapy  SpO2: 100 %  Pulse via Oximetry: 84 beats per minute  Pulse Oximeter Device Mode: Intermittent  Pulse Oximeter Device Location: Finger  O2 Device: None (Room air)  O2 Flow Rate (L/min): 2 L/min    Estimated body mass index is 26.63 kg/m² as calculated from the following:    Height as of this encounter: 1.651 m (5' 5\").    Weight as of this encounter: 72.6 kg (160 lb).    Intake/Output Summary (Last 24 hours) at 5/30/2025 0813  Last data filed at 5/29/2025 1741  Gross per 24 hour   Intake 441.34 ml   Output --   Net 441.34 ml         Physical Exam:    General:    Well nourished.  No overt distress  Head:  Normocephalic, atraumatic  Eyes:  Sclerae appear normal.  Pupils equally round.

## 2025-05-30 NOTE — PLAN OF CARE
Problem: Discharge Planning  Goal: Discharge to home or other facility with appropriate resources  5/30/2025 0722 by Claudia Petit RN  Outcome: Progressing  5/29/2025 1954 by Chevalier, [place], RN  Outcome: Progressing  5/29/2025 1742 by Kaci Rice RN  Outcome: Progressing     Problem: Pain  Goal: Verbalizes/displays adequate comfort level or baseline comfort level  5/30/2025 0722 by Claudia Petit RN  Outcome: Progressing  5/29/2025 1954 by Chevalier, [place], RN  Outcome: Progressing  Flowsheets (Taken 5/29/2025 1954)  Verbalizes/displays adequate comfort level or baseline comfort level:   Encourage patient to monitor pain and request assistance   Assess pain using appropriate pain scale  5/29/2025 1742 by Kaci Rice RN  Outcome: Progressing     Problem: Safety - Adult  Goal: Free from fall injury  5/30/2025 0722 by Claudia Petit RN  Outcome: Progressing  5/29/2025 1954 by Chevalier, [place], RN  Outcome: Progressing  Flowsheets (Taken 5/29/2025 1954)  Free From Fall Injury: Instruct family/caregiver on patient safety  5/29/2025 1742 by Kaci Rice RN  Outcome: Progressing

## 2025-05-30 NOTE — DISCHARGE INSTRUCTIONS
Animal Bites: Care Instructions  Overview  After an animal bite, the biggest concern is infection. The chance of infection depends on the type of animal that bit you, where on your body you were bitten, and your general health. Many animal bites are not closed with stitches, because this can increase the chance of infection.  Your bite may take as little as 7 days or as long as several months to heal, depending on how bad it is. Taking good care of your wound at home will help it heal and reduce your chance of infection.  The doctor has checked you carefully, but problems can develop later. If you notice any problems or new symptoms, get medical treatment right away.  Follow-up care is a key part of your treatment and safety. Be sure to make and go to all appointments, and call your doctor if you are having problems. It's also a good idea to know your test results and keep a list of the medicines you take.  How can you care for yourself at home?  If your doctor told you how to care for your wound, follow your doctor's instructions. If you did not get instructions, follow this general advice:  After 24 to 48 hours, gently wash the wound with clean water 2 times a day. Do not scrub or soak the wound. Don't use hydrogen peroxide or alcohol, which can slow healing.  You may cover the wound with a thin layer of petroleum jelly, such as Vaseline, and a nonstick bandage.  Apply more petroleum jelly and replace the bandage as needed.  After you shower, gently dry the wound with a clean towel.  If your doctor has closed the wound, cover the bandage with a plastic bag before you take a shower.  A small amount of skin redness and swelling around the wound edges and the stitches or staples is normal. Your wound may itch or feel irritated. Do not scratch or rub the wound.  Ask your doctor if you can take an over-the-counter pain medicine, such as acetaminophen (Tylenol), ibuprofen (Advil, Motrin), or naproxen (Aleve). Read

## 2025-05-30 NOTE — PROGRESS NOTES
Outpatient Pharmacy Progress Note for Meds-to-Beds    Total number of Prescriptions Filled: 2    List of Medications (name,strength):  amoxicillin-clavulanate 875-125  saccharomyces boulardii 250mg      Delivered to: whitney chopra      Co-pay:10.88      Payment Type: cash      Additional Documentation:  Medication(s) were delivered to the patient's room prior to discharge      Thank you for letting us serve your patients.  [facility] [id number] [address]  Phone: [phone]  Fax: [phone]

## 2025-05-30 NOTE — PROGRESS NOTES
Discharge medications reviewed with the patient and appropriate educational materials and side effects teaching were provided. IV removed with tip intact. NAD noted. Daughter to take her home. Meds were delivered and compression hose applied to right arm.

## 2025-06-01 LAB
BACTERIA SPEC CULT: NORMAL
BACTERIA SPEC CULT: NORMAL
SERVICE CMNT-IMP: NORMAL
SERVICE CMNT-IMP: NORMAL

## 2025-06-02 ENCOUNTER — FOLLOWUP TELEPHONE ENCOUNTER (OUTPATIENT)
Dept: CASE MANAGEMENT | Age: 85
End: 2025-06-02

## 2025-06-02 NOTE — PROGRESS NOTES
Follow up call attempted with patient and daughter.  Message left to return call on daughters number. Awaiting call back.       1422:  Patient returned call. Patient states she is doing well, swelling in arm has decreased and she is taking antibiotics as prescribed. Patient states she is eating well and staying hydrated. No new needs identified at this time. Navigator will continue to follow.

## 2025-06-05 NOTE — PROGRESS NOTES
Physician Progress Note      PATIENT:               SOO TRUJILLO  CSN #:                  [id number]  :                       1940  ADMIT DATE:       2025 1:45 PM  DISCH DATE:        2025 1:20 PM  RESPONDING  PROVIDER #:        Tomasa Dominguez MD          QUERY TEXT:    Sepsis is documented in the medical record  H&P. Please provide additional   clinical indicators supportive of your documentation. Or please document if   the diagnosis of sepsis has been ruled out after study.    The clinical indicators include:  86yo, female, cat bite   H&P \"Severe sepsis: Cat bite\"   DC Summary \"admitted on  following a cat bite of her right wrist,   found to be with sepsis due to cellulitis of right upper extremity. Lactic   acid was elevated on admission with leukocytosis of 13.3 K. Patient was   admitted with IV fluids and IV antibiotics (Unasyn every 6 hours). Patient's   blood cultures have been negative till date. Patient's redness, warmth and   swelling has subsided significantly. Patient will complete 7 more days of   Augmentin after discharge. Patient was evaluated by orthopedics however did   not recommend for any surgical intervention given no obvious fluid   collection.\"  , RR 20, 99% on room air, 92% on 2L, 100% on room air  lactic 3.1, wbc 13.3  labs, imaging, ortho consult, IV fluids, IV unasyn  Options provided:  -- Sepsis present as evidenced by, Please document evidence.  -- Sepsis was ruled out after study  -- Other - I will add my own diagnosis  -- Disagree - Not applicable / Not valid  -- Disagree - Clinically unable to determine / Unknown  -- Refer to Clinical Documentation Reviewer    PROVIDER RESPONSE TEXT:    Sepsis is present as evidenced by tachycardic, lactic acidosis, leukocytosis   and source of infection    Query created by: Ese Mireles on 2025 10:27 AM      Electronically signed by:  Tomasa Dominguez MD 2025 7:25 AM

## 2025-06-09 ENCOUNTER — FOLLOWUP TELEPHONE ENCOUNTER (OUTPATIENT)
Dept: CASE MANAGEMENT | Age: 85
End: 2025-06-09

## 2025-06-18 ENCOUNTER — FOLLOWUP TELEPHONE ENCOUNTER (OUTPATIENT)
Dept: CASE MANAGEMENT | Age: 85
End: 2025-06-18

## 2025-06-18 NOTE — PROGRESS NOTES
Follow up call made with patient. Patient states she is doing great, has returned home to [place], wound on arm has healed and no longer causing an issue. Patient states follow up with PCP in [place] will happen next week. No new issues discovered. Navigator will continue to follow until 6/30/25.

## 2025-06-23 ENCOUNTER — FOLLOWUP TELEPHONE ENCOUNTER (OUTPATIENT)
Dept: CASE MANAGEMENT | Age: 85
End: 2025-06-23

## 2025-07-07 ENCOUNTER — FOLLOWUP TELEPHONE ENCOUNTER (OUTPATIENT)
Dept: CASE MANAGEMENT | Age: 85
End: 2025-07-07

## 2025-07-07 NOTE — PROGRESS NOTES
Final follow up call made with patient. Patient is back in [place] and doing very well. States she has had no issues since arriving home. No new needs identified.   This RN navigator has completed following patient for 30 days post-discharge, to prevent hospital readmission. This RN navigator will no longer be following.      Glabellar Complex Units: 20